# Patient Record
Sex: MALE | Race: BLACK OR AFRICAN AMERICAN | NOT HISPANIC OR LATINO | Employment: UNEMPLOYED | URBAN - METROPOLITAN AREA
[De-identification: names, ages, dates, MRNs, and addresses within clinical notes are randomized per-mention and may not be internally consistent; named-entity substitution may affect disease eponyms.]

---

## 2024-01-17 ENCOUNTER — TRANSFERRED RECORDS (OUTPATIENT)
Dept: MULTI SPECIALTY CLINIC | Facility: CLINIC | Age: 54
End: 2024-01-17

## 2024-01-17 LAB — RETINOPATHY: NORMAL

## 2024-01-31 ENCOUNTER — TRANSFERRED RECORDS (OUTPATIENT)
Dept: HEALTH INFORMATION MANAGEMENT | Facility: CLINIC | Age: 54
End: 2024-01-31

## 2024-04-04 ENCOUNTER — ORDERS ONLY (AUTO-RELEASED) (OUTPATIENT)
Dept: FAMILY MEDICINE | Facility: CLINIC | Age: 54
End: 2024-04-04

## 2024-04-04 ENCOUNTER — OFFICE VISIT (OUTPATIENT)
Dept: FAMILY MEDICINE | Facility: CLINIC | Age: 54
End: 2024-04-04
Payer: COMMERCIAL

## 2024-04-04 VITALS
RESPIRATION RATE: 20 BRPM | HEART RATE: 84 BPM | TEMPERATURE: 97.2 F | HEIGHT: 73 IN | OXYGEN SATURATION: 97 % | BODY MASS INDEX: 39.49 KG/M2 | DIASTOLIC BLOOD PRESSURE: 76 MMHG | WEIGHT: 298 LBS | SYSTOLIC BLOOD PRESSURE: 118 MMHG

## 2024-04-04 DIAGNOSIS — Z12.11 SCREEN FOR COLON CANCER: ICD-10-CM

## 2024-04-04 DIAGNOSIS — E66.01 CLASS 2 SEVERE OBESITY DUE TO EXCESS CALORIES WITH SERIOUS COMORBIDITY AND BODY MASS INDEX (BMI) OF 38.0 TO 38.9 IN ADULT (H): ICD-10-CM

## 2024-04-04 DIAGNOSIS — E78.2 MIXED HYPERLIPIDEMIA: ICD-10-CM

## 2024-04-04 DIAGNOSIS — E66.812 CLASS 2 SEVERE OBESITY DUE TO EXCESS CALORIES WITH SERIOUS COMORBIDITY AND BODY MASS INDEX (BMI) OF 38.0 TO 38.9 IN ADULT (H): ICD-10-CM

## 2024-04-04 DIAGNOSIS — Z11.59 NEED FOR HEPATITIS C SCREENING TEST: ICD-10-CM

## 2024-04-04 DIAGNOSIS — Z00.00 ROUTINE GENERAL MEDICAL EXAMINATION AT A HEALTH CARE FACILITY: Primary | ICD-10-CM

## 2024-04-04 DIAGNOSIS — L84 CALLUS OF FOOT: ICD-10-CM

## 2024-04-04 DIAGNOSIS — E11.3299 TYPE 2 DIABETES MELLITUS WITH MILD NONPROLIFERATIVE RETINOPATHY, WITHOUT LONG-TERM CURRENT USE OF INSULIN, MACULAR EDEMA PRESENCE UNSPECIFIED, UNSPECIFIED LATERALITY (H): ICD-10-CM

## 2024-04-04 DIAGNOSIS — Z11.4 SCREENING FOR HIV (HUMAN IMMUNODEFICIENCY VIRUS): ICD-10-CM

## 2024-04-04 PROBLEM — E11.39 TYPE 2 DIABETES MELLITUS WITH OPHTHALMIC COMPLICATION, WITHOUT LONG-TERM CURRENT USE OF INSULIN (H): Status: ACTIVE | Noted: 2024-04-04

## 2024-04-04 LAB
ALBUMIN SERPL BCG-MCNC: 4.8 G/DL (ref 3.5–5.2)
ALP SERPL-CCNC: 97 U/L (ref 40–150)
ALT SERPL W P-5'-P-CCNC: 24 U/L (ref 0–70)
ANION GAP SERPL CALCULATED.3IONS-SCNC: 11 MMOL/L (ref 7–15)
AST SERPL W P-5'-P-CCNC: 21 U/L (ref 0–45)
BASOPHILS # BLD AUTO: 0 10E3/UL (ref 0–0.2)
BASOPHILS NFR BLD AUTO: 1 %
BILIRUB SERPL-MCNC: 0.3 MG/DL
BUN SERPL-MCNC: 10.5 MG/DL (ref 6–20)
CALCIUM SERPL-MCNC: 10 MG/DL (ref 8.6–10)
CHLORIDE SERPL-SCNC: 101 MMOL/L (ref 98–107)
CHOLEST SERPL-MCNC: 290 MG/DL
CREAT SERPL-MCNC: 1.06 MG/DL (ref 0.67–1.17)
CREAT UR-MCNC: 266 MG/DL
DEPRECATED HCO3 PLAS-SCNC: 26 MMOL/L (ref 22–29)
EGFRCR SERPLBLD CKD-EPI 2021: 85 ML/MIN/1.73M2
EOSINOPHIL # BLD AUTO: 0.1 10E3/UL (ref 0–0.7)
EOSINOPHIL NFR BLD AUTO: 2 %
ERYTHROCYTE [DISTWIDTH] IN BLOOD BY AUTOMATED COUNT: 12 % (ref 10–15)
FASTING STATUS PATIENT QL REPORTED: YES
FASTING STATUS PATIENT QL REPORTED: YES
GLUCOSE SERPL-MCNC: 142 MG/DL (ref 70–99)
GLUCOSE SERPL-MCNC: 142 MG/DL (ref 70–99)
HBA1C MFR BLD: 7.2 % (ref 0–5.6)
HCT VFR BLD AUTO: 48.5 % (ref 40–53)
HCV AB SERPL QL IA: NONREACTIVE
HDLC SERPL-MCNC: 34 MG/DL
HGB BLD-MCNC: 15.8 G/DL (ref 13.3–17.7)
HIV 1+2 AB+HIV1 P24 AG SERPL QL IA: NONREACTIVE
IMM GRANULOCYTES # BLD: 0 10E3/UL
IMM GRANULOCYTES NFR BLD: 1 %
LDLC SERPL CALC-MCNC: 210 MG/DL
LYMPHOCYTES # BLD AUTO: 2.5 10E3/UL (ref 0.8–5.3)
LYMPHOCYTES NFR BLD AUTO: 37 %
MCH RBC QN AUTO: 29 PG (ref 26.5–33)
MCHC RBC AUTO-ENTMCNC: 32.6 G/DL (ref 31.5–36.5)
MCV RBC AUTO: 89 FL (ref 78–100)
MICROALBUMIN UR-MCNC: 25.4 MG/L
MICROALBUMIN/CREAT UR: 9.55 MG/G CR (ref 0–17)
MONOCYTES # BLD AUTO: 0.4 10E3/UL (ref 0–1.3)
MONOCYTES NFR BLD AUTO: 6 %
NEUTROPHILS # BLD AUTO: 3.6 10E3/UL (ref 1.6–8.3)
NEUTROPHILS NFR BLD AUTO: 55 %
NONHDLC SERPL-MCNC: 256 MG/DL
PLATELET # BLD AUTO: 262 10E3/UL (ref 150–450)
POTASSIUM SERPL-SCNC: 4.5 MMOL/L (ref 3.4–5.3)
PROT SERPL-MCNC: 7.7 G/DL (ref 6.4–8.3)
RBC # BLD AUTO: 5.44 10E6/UL (ref 4.4–5.9)
SODIUM SERPL-SCNC: 138 MMOL/L (ref 135–145)
TRIGL SERPL-MCNC: 231 MG/DL
TSH SERPL DL<=0.005 MIU/L-ACNC: 1.91 UIU/ML (ref 0.3–4.2)
WBC # BLD AUTO: 6.7 10E3/UL (ref 4–11)

## 2024-04-04 PROCEDURE — 90471 IMMUNIZATION ADMIN: CPT | Performed by: FAMILY MEDICINE

## 2024-04-04 PROCEDURE — 85025 COMPLETE CBC W/AUTO DIFF WBC: CPT | Performed by: FAMILY MEDICINE

## 2024-04-04 PROCEDURE — 82570 ASSAY OF URINE CREATININE: CPT | Performed by: FAMILY MEDICINE

## 2024-04-04 PROCEDURE — 99386 PREV VISIT NEW AGE 40-64: CPT | Mod: 25 | Performed by: FAMILY MEDICINE

## 2024-04-04 PROCEDURE — 83036 HEMOGLOBIN GLYCOSYLATED A1C: CPT | Performed by: FAMILY MEDICINE

## 2024-04-04 PROCEDURE — 80053 COMPREHEN METABOLIC PANEL: CPT | Performed by: FAMILY MEDICINE

## 2024-04-04 PROCEDURE — 84443 ASSAY THYROID STIM HORMONE: CPT | Performed by: FAMILY MEDICINE

## 2024-04-04 PROCEDURE — 80061 LIPID PANEL: CPT | Performed by: FAMILY MEDICINE

## 2024-04-04 PROCEDURE — 86803 HEPATITIS C AB TEST: CPT | Performed by: FAMILY MEDICINE

## 2024-04-04 PROCEDURE — 36415 COLL VENOUS BLD VENIPUNCTURE: CPT | Performed by: FAMILY MEDICINE

## 2024-04-04 PROCEDURE — 90715 TDAP VACCINE 7 YRS/> IM: CPT | Performed by: FAMILY MEDICINE

## 2024-04-04 PROCEDURE — 82043 UR ALBUMIN QUANTITATIVE: CPT | Performed by: FAMILY MEDICINE

## 2024-04-04 PROCEDURE — 87389 HIV-1 AG W/HIV-1&-2 AB AG IA: CPT | Performed by: FAMILY MEDICINE

## 2024-04-04 PROCEDURE — 99214 OFFICE O/P EST MOD 30 MIN: CPT | Mod: 25 | Performed by: FAMILY MEDICINE

## 2024-04-04 SDOH — HEALTH STABILITY: PHYSICAL HEALTH: ON AVERAGE, HOW MANY DAYS PER WEEK DO YOU ENGAGE IN MODERATE TO STRENUOUS EXERCISE (LIKE A BRISK WALK)?: 0 DAYS

## 2024-04-04 ASSESSMENT — PATIENT HEALTH QUESTIONNAIRE - PHQ9
SUM OF ALL RESPONSES TO PHQ QUESTIONS 1-9: 11
10. IF YOU CHECKED OFF ANY PROBLEMS, HOW DIFFICULT HAVE THESE PROBLEMS MADE IT FOR YOU TO DO YOUR WORK, TAKE CARE OF THINGS AT HOME, OR GET ALONG WITH OTHER PEOPLE: VERY DIFFICULT
SUM OF ALL RESPONSES TO PHQ QUESTIONS 1-9: 11

## 2024-04-04 ASSESSMENT — SOCIAL DETERMINANTS OF HEALTH (SDOH): HOW OFTEN DO YOU GET TOGETHER WITH FRIENDS OR RELATIVES?: THREE TIMES A WEEK

## 2024-04-04 NOTE — PATIENT INSTRUCTIONS
Preventive Care Advice   This is general advice given by our system to help you stay healthy. However, your care team may have specific advice just for you. Please talk to your care team about your preventive care needs.  Nutrition  Eat 5 or more servings of fruits and vegetables each day.  Try wheat bread, brown rice and whole grain pasta (instead of white bread, rice, and pasta).  Get enough calcium and vitamin D. Check the label on foods and aim for 100% of the RDA (recommended daily allowance).  Lifestyle  Exercise at least 150 minutes each week   (30 minutes a day, 5 days a week).  Do muscle strengthening activities 2 days a week. These help control your weight and prevent disease.  No smoking.  Wear sunscreen to prevent skin cancer.  Have a dental exam and cleaning every 6 months.  Yearly exams  See your health care team every year to talk about:  Any changes in your health.  Any medicines your care team has prescribed.  Preventive care, family planning, and ways to prevent chronic diseases.  Shots (vaccines)   HPV shots (up to age 26), if you've never had them before.  Hepatitis B shots (up to age 59), if you've never had them before.  COVID-19 shot: Get this shot when it's due.  Flu shot: Get a flu shot every year.  Tetanus shot: Get a tetanus shot every 10 years.  Pneumococcal, hepatitis A, and RSV shots: Ask your care team if you need these based on your risk.  Shingles shot (for age 50 and up).  General health tests  Diabetes screening:  Starting at age 35, Get screened for diabetes at least every 3 years.  If you are younger than age 35, ask your care team if you should be screened for diabetes.  Cholesterol test: At age 39, start having a cholesterol test every 5 years, or more often if advised.  Bone density scan (DEXA): At age 50, ask your care team if you should have this scan for osteoporosis (brittle bones).  Hepatitis C: Get tested at least once in your life.  STIs (sexually transmitted  infections)  Before age 24: Ask your care team if you should be screened for STIs.  After age 24: Get screened for STIs if you're at risk. You are at risk for STIs (including HIV) if:  You are sexually active with more than one person.  You don't use condoms every time.  You or a partner was diagnosed with a sexually transmitted infection.  If you are at risk for HIV, ask about PrEP medicine to prevent HIV.  Get tested for HIV at least once in your life, whether you are at risk for HIV or not.  Cancer screening tests  Cervical cancer screening: If you have a cervix, begin getting regular cervical cancer screening tests at age 21. Most people who have regular screenings with normal results can stop after age 65. Talk about this with your provider.  Breast cancer scan (mammogram): If you've ever had breasts, begin having regular mammograms starting at age 40. This is a scan to check for breast cancer.  Colon cancer screening: It is important to start screening for colon cancer at age 45.  Have a colonoscopy test every 10 years (or more often if you're at risk) Or, ask your provider about stool tests like a FIT test every year or Cologuard test every 3 years.  To learn more about your testing options, visit: [email or web address].  For help making a decision, visit: https://bit.ly/xe95977.  Prostate cancer screening test: If you have a prostate and are age 55 to 69, ask your provider if you would benefit from a yearly prostate cancer screening test.  Lung cancer screening: If you are a current or former smoker age 50 to 80, ask your care team if ongoing lung cancer screenings are right for you.  For informational purposes only. Not to replace the advice of your health care provider. Copyright   2023 [employer]. All rights reserved. Clinically reviewed by the [facility]. [employer] 121915 - REV 01/24.    Learning About Stress  What is stress?     Stress is your  body's response to a hard situation. Your body can have a physical, emotional, or mental response. Stress is a fact of life for most people, and it affects everyone differently. What causes stress for you may not be stressful for someone else.  A lot of things can cause stress. You may feel stress when you go on a job interview, take a test, or run a race. This kind of short-term stress is normal and even useful. It can help you if you need to work hard or react quickly. For example, stress can help you finish an important job on time.  Long-term stress is caused by ongoing stressful situations or events. Examples of long-term stress include long-term health problems, ongoing problems at work, or conflicts in your family. Long-term stress can harm your health.  How does stress affect your health?  When you are stressed, your body responds as though you are in danger. It makes hormones that speed up your heart, make you breathe faster, and give you a burst of energy. This is called the fight-or-flight stress response. If the stress is over quickly, your body goes back to normal and no harm is done.  But if stress happens too often or lasts too long, it can have bad effects. Long-term stress can make you more likely to get sick, and it can make symptoms of some diseases worse. If you tense up when you are stressed, you may develop neck, shoulder, or low back pain. Stress is linked to high blood pressure and heart disease.  Stress also harms your emotional health. It can make you davila, tense, or depressed. Your relationships may suffer, and you may not do well at work or school.  What can you do to manage stress?  You can try these things to help manage stress:   Do something active. Exercise or activity can help reduce stress. Walking is a great way to get started. Even everyday activities such as housecleaning or yard work can help.  Try yoga or ronnie chi. These techniques combine exercise and meditation. You may need  some training at first to learn them.  Do something you enjoy. For example, listen to music or go to a movie. Practice your hobby or do volunteer work.  Meditate. This can help you relax, because you are not worrying about what happened before or what may happen in the future.  Do guided imagery. Imagine yourself in any setting that helps you feel calm. You can use online videos, books, or a teacher to guide you.  Do breathing exercises. For example:  From a standing position, bend forward from the waist with your knees slightly bent. Let your arms dangle close to the floor.  Breathe in slowly and deeply as you return to a standing position. Roll up slowly and lift your head last.  Hold your breath for just a few seconds in the standing position.  Breathe out slowly and bend forward from the waist.  Let your feelings out. Talk, laugh, cry, and express anger when you need to. Talking with supportive friends or family, a counselor, or a prudence leader about your feelings is a healthy way to relieve stress. Avoid discussing your feelings with people who make you feel worse.  Write. It may help to write about things that are bothering you. This helps you find out how much stress you feel and what is causing it. When you know this, you can find better ways to cope.  What can you do to prevent stress?  You might try some of these things to help prevent stress:  Manage your time. This helps you find time to do the things you want and need to do.  Get enough sleep. Your body recovers from the stresses of the day while you are sleeping.  Get support. Your family, friends, and community can make a difference in how you experience stress.  Limit your news feed. Avoid or limit time on social media or news that may make you feel stressed.  Do something active. Exercise or activity can help reduce stress. Walking is a great way to get started.  Where can you learn more?  Go to https://www.healthwise.net/patiented  Enter N032 in the  "search box to learn more about \"Learning About Stress.\"  Current as of: October 24, 2023               Content Version: 14.0    0239-2561 [employer].   Care instructions adapted under license by your healthcare professional. If you have questions about a medical condition or this instruction, always ask your healthcare professional. [employer] disclaims any warranty or liability for your use of this information.      Learning About Depression Screening  What is depression screening?  Depression screening is a way to see if you have depression symptoms. It may be done by a doctor or counselor. It's often part of a routine checkup. That's because your mental health is just as important as your physical health.  Depression is a mental health condition that affects how you feel, think, and act. You may:  Have less energy.  Lose interest in your daily activities.  Feel sad and grouchy for a long time.  Depression is very common. It affects people of all ages.  Many things can lead to depression. Some people become depressed after they have a stroke or find out they have a major illness like cancer or heart disease. The death of a loved one or a breakup may lead to depression. It can run in families. Most experts believe that a combination of inherited genes and stressful life events can cause it.  What happens during screening?  You may be asked to fill out a form about your depression symptoms. You and the doctor will discuss your answers. The doctor may ask you more questions to learn more about how you think, act, and feel.  What happens after screening?  If you have symptoms of depression, your doctor will talk to you about your options.  Doctors usually treat depression with medicines or counseling. Often, combining the two works best. Many people don't get help because they think that they'll get over the depression on their own. But people with depression may not get better unless they " "get treatment.  The cause of depression is not well understood. There may be many factors involved. But if you have depression, it's not your fault.  A serious symptom of depression is thinking about death or suicide. If you or someone you care about talks about this or about feeling hopeless, get help right away.  It's important to know that depression can be treated. Medicine, counseling, and self-care may help.  Where can you learn more?  Go to https://www.DocumentCloud.net/patiented  Enter T185 in the search box to learn more about \"Learning About Depression Screening.\"  Current as of: June 24, 2023               Content Version: 14.0    0206-5472 [employer].   Care instructions adapted under license by your healthcare professional. If you have questions about a medical condition or this instruction, always ask your healthcare professional. [employer] disclaims any warranty or liability for your use of this information.      Substance Use Disorder: Care Instructions  Overview     You can improve your life and health by stopping your use of alcohol or drugs. When you don't drink or use drugs, you may feel and sleep better. You may get along better with your family, friends, and coworkers. There are medicines and programs that can help with substance use disorder.  How can you care for yourself at home?  Here are some ways to help you stay sober and prevent relapse.  If you have been given medicine to help keep you sober or reduce your cravings, be sure to take it exactly as prescribed.  Talk to your doctor about programs that can help you stop using drugs or drinking alcohol.  Do not keep alcohol or drugs in your home.  Plan ahead. Think about what you'll say if other people ask you to drink or use drugs. Try not to spend time with people who drink or use drugs.  Use the time and money spent on drinking or drugs to do something that's important to you.  Preventing a relapse  Have a plan " to deal with relapse. Learn to recognize changes in your thinking that lead you to drink or use drugs. Get help before you start to drink or use drugs again.  Try to stay away from situations, friends, or places that may lead you to drink or use drugs.  If you feel the need to drink alcohol or use drugs again, seek help right away. Call a trusted friend or family member. Some people get support from organizations such as Narcotics Anonymous or [employer] or from treatment facilities.  If you relapse, get help as soon as you can. Some people make a plan with another person that outlines what they want that person to do for them if they relapse. The plan usually includes how to handle the relapse and who to notify in case of relapse.  Don't give up. Remember that a relapse doesn't mean that you have failed. Use the experience to learn the triggers that lead you to drink or use drugs. Then quit again. Recovery is a lifelong process. Many people have several relapses before they are able to quit for good.  Follow-up care is a key part of your treatment and safety. Be sure to make and go to all appointments, and call your doctor if you are having problems. It's also a good idea to know your test results and keep a list of the medicines you take.  When should you call for help?   Call 911  anytime you think you may need emergency care. For example, call if you or someone else:    Has overdosed or has withdrawal signs. Be sure to tell the emergency workers that you are or someone else is using or trying to quit using drugs. Overdose or withdrawal signs may include:  Losing consciousness.  Seizure.  Seeing or hearing things that aren't there (hallucinations).     Is thinking or talking about suicide or harming others.   Where to get help 24 hours a day, 7 days a week   If you or someone you know talks about suicide, self-harm, a mental health crisis, a substance use crisis, or any other kind of emotional distress, get  "help right away. You can:    Call the Suicide and Crisis Lifeline at 988.     Call 9-794-664-IUOP (1-967.144.6897).     Text HOME to 826802 to access the Crisis Text Line.   Consider saving these numbers in your phone.  Go to [email or web address] for more information or to chat online.  Call your doctor now or seek immediate medical care if:    You are having withdrawal symptoms. These may include nausea or vomiting, sweating, shakiness, and anxiety.   Watch closely for changes in your health, and be sure to contact your doctor if:    You have a relapse.     You need more help or support to stop.   Where can you learn more?  Go to https://www.Book&Table.net/patiented  Enter H573 in the search box to learn more about \"Substance Use Disorder: Care Instructions.\"  Current as of: November 15, 2023               Content Version: 14.0    4891-2271 [employer].   Care instructions adapted under license by your healthcare professional. If you have questions about a medical condition or this instruction, always ask your healthcare professional. [employer] disclaims any warranty or liability for your use of this information.      "

## 2024-04-04 NOTE — PROGRESS NOTES
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  - Glucose; Future  - Lipid panel reflex to direct LDL Non-fasting; Future  - Comprehensive metabolic panel; Future  - Hemoglobin A1c; Future  - Lipid panel reflex to direct LDL Fasting; Future  - TSH with free T4 reflex; Future  - CBC with Platelets & Differential; Future  - Orthopedic  Referral; Future  - Albumin Random Urine Quantitative with Creat Ratio; Future  - Glucose  - Lipid panel reflex to direct LDL Non-fasting  - Comprehensive metabolic panel  - Hemoglobin A1c  - TSH with free T4 reflex  - CBC with Platelets & Differential  - Albumin Random Urine Quantitative with Creat Ratio    Class 2 severe obesity due to excess calories with serious comorbidity and body mass index (BMI) of 38.0 to 38.9 in adult (H)  Discussed weight loss program, consider GLP-1 agonist.    Callus of foot  Refer to podiatrist for additional care.  - Orthopedic  Referral; Future  Depression with possible PTSD, patient encouraged to continue to follow-up with outpatient therapist    Patient has been advised of split billing requirements and indicates understanding:  "Yes  Review of external notes as documented elsewhere in note  40 minutes spent by me on the date of the encounter doing chart review, review of outside records, review of test results, interpretation of tests, patient visit, and documentation   20-minutes in excess was spent in discussing management of diabetes, mental illness, regular follow-up.    Nicotine/Tobacco Cessation  He reports that he has been smoking other. He has never used smokeless tobacco.  Nicotine/Tobacco Cessation Plan  Phone counseling: Place order for Quit Partner Referral      BMI  Estimated body mass index is 38.99 kg/m  as calculated from the following:    Height as of this encounter: 1.862 m (6' 1.31\").    Weight as of this encounter: 135.2 kg (298 lb).   Weight management plan: Discussed healthy diet and exercise guidelines    Depression Screening Follow Up        4/4/2024     9:20 AM   PHQ   PHQ-9 Total Score 11   Q9: Thoughts of better off dead/self-harm past 2 weeks Not at all         4/4/2024     9:20 AM   Last PHQ-9   1.  Little interest or pleasure in doing things 2   2.  Feeling down, depressed, or hopeless 2   3.  Trouble falling or staying asleep, or sleeping too much 3   4.  Feeling tired or having little energy 1   5.  Poor appetite or overeating 1   6.  Feeling bad about yourself 1   7.  Trouble concentrating 1   8.  Moving slowly or restless 0   Q9: Thoughts of better off dead/self-harm past 2 weeks 0   PHQ-9 Total Score 11         Follow Up Actions Taken  Crisis resource information provided in After Visit Summary  Referred patient back to current mental health provider.     Counseling  Appropriate preventive services were discussed with this patient, including applicable screening as appropriate for fall prevention, nutrition, physical activity, Tobacco-use cessation, weight loss and cognition.  Checklist reviewing preventive services available has been given to the patient.  Reviewed patient's diet, addressing concerns " and/or questions.   The patient was instructed to see the dentist every 6 months.   The patient's PHQ-9 score is consistent with moderate depression. He was provided with information regarding depression.       Work on weight loss  Regular exercise    Teddy Dorsey is a 53 year old, presenting for the following:  Physical, Eye Problem (Last vision check was 1/2024 at [facility] Breathing Problem, and Update allergy (Penicillin/)        4/4/2024     9:27 AM   Additional Questions   Roomed by Donna HERNANDES   Accompanied by self        Health Care Directive  Patient does not have a Health Care Directive or Living Will: Discussed advance care planning with patient; information given to patient to review.    HPI  [facility] but [facility] not planning to [facility] because of lack of access and resources.  He has depression,?PTSD, he is currently follow-up with a outpatient therapist, he also has diabetes type 2, initially on Ozempic and insulin, but because of medication and backorder issues only stay on metformin, currently taking as needed for high blood sugar.  Is due for diabetes check.  Denies any excessive urination or thirst.  He is retired from the [employer], also works at [employer] for 11 years, was born in [place].        4/4/2024   General Health   How would you rate your overall physical health? (!) POOR   Feel stress (tense, anxious, or unable to sleep) Very much   (!) STRESS CONCERN      4/4/2024   Nutrition   Three or more servings of calcium each day? (!) NO   Diet: Diabetic   How many servings of fruit and vegetables per day? (!) 2-3   How many sweetened beverages each day? 0-1         4/4/2024   Exercise   Days per week of moderate/strenous exercise 0 days   (!) EXERCISE CONCERN      4/4/2024   Social Factors   Frequency of gathering with friends or relatives Three times a week   Worry food won't last until get money to buy more Yes   Food not last or not have  enough money for food? Yes   Do you have housing?  Yes   Are you worried about losing your housing? No   Lack of transportation? Yes   Unable to get utilities (heat,electricity)? No   (!) FOOD SECURITY CONCERN PRESENT (!) TRANSPORTATION CONCERN PRESENT       No data to display                   4/4/2024   Dental   Dentist two times every year? (!) NO         4/4/2024   TB Screening   Were you born outside of the [place]? No       Today's PHQ-9 Score:       4/4/2024     9:20 AM   PHQ-9 SCORE   PHQ-9 Total Score MyChart 11 (Moderate depression)   PHQ-9 Total Score 11         4/4/2024   Substance Use   Alcohol more than 3/day or more than 7/wk No   Do you use any other substances recreationally? (!) CANNABIS PRODUCTS     Social History     Tobacco Use    Smoking status: Every Day     Types: Other    Smokeless tobacco: Never    Tobacco comments:     Smoke marijuana             4/4/2024   One time HIV Screening   Previous HIV test? No         4/4/2024   STI Screening   New sexual partner(s) since last STI/HIV test? No   ASCVD Risk   The ASCVD Risk score (Nikki CLAY, et al., 2019) failed to calculate for the following reasons:    Cannot find a previous HDL lab    Cannot find a previous total cholesterol lab    The BP treatment status is invalid           Reviewed and updated as needed this visit by Provider                    No past medical history on file.  No past surgical history on file.  BP Readings from Last 3 Encounters:   04/04/24 118/76    Wt Readings from Last 3 Encounters:   04/04/24 135.2 kg (298 lb)                  Patient Active Problem List   Diagnosis    Type 2 diabetes mellitus with ophthalmic complication, without long-term current use of insulin (H)    Class 2 severe obesity due to excess calories with serious comorbidity in adult (H)    Callus of foot     No past surgical history on file.    Social History     Tobacco Use    Smoking status: Every Day     Types: Other    Smokeless tobacco: Never     "Tobacco comments:     Smoke marijuana   Substance Use Topics    Alcohol use: Not on file     No family history on file.      Recent Labs   Lab Test 04/04/24  1032   A1C 7.2*   *   HDL 34*   TRIG 231*   ALT 24   CR 1.06   GFRESTIMATED 85   POTASSIUM 4.5   TSH 1.91          Review of Systems  Constitutional, HEENT, cardiovascular, pulmonary, gi and gu systems are negative, except as otherwise noted.     Objective    Exam  /76 (BP Location: Left arm, Patient Position: Sitting, Cuff Size: Adult Large)   Pulse 84   Temp 97.2  F (36.2  C) (Temporal)   Resp 20   Ht 1.862 m (6' 1.31\")   Wt 135.2 kg (298 lb)   SpO2 97%   BMI 38.99 kg/m     Estimated body mass index is 38.99 kg/m  as calculated from the following:    Height as of this encounter: 1.862 m (6' 1.31\").    Weight as of this encounter: 135.2 kg (298 lb).    Physical Exam  GENERAL: alert and no distress  NECK: no adenopathy, no asymmetry, masses, or scars  RESP: lungs clear to auscultation - no rales, rhonchi or wheezes  CV: regular rate and rhythm, normal S1 S2, no S3 or S4, no murmur, click or rub, no peripheral edema  ABDOMEN: soft, nontender, no hepatosplenomegaly, no masses and bowel sounds normal  MS: no gross musculoskeletal defects noted, no edema  NEURO: Normal strength and tone, mentation intact and speech normal  Diabetic foot exam: normal DP and PT pulses, normal sensory exam, trophic changes calluses, bunions, and onychomycosis        Signed Electronically by: Kiana Fowler MD      Prior to immunization administration, verified patients identity using patient s name and date of birth. Please see Immunization Activity for additional information.     Screening Questionnaire for Adult Immunization    Are you sick today?   No   Do you have allergies to medications, food, a vaccine component or latex?   Yes   Have you ever had a serious reaction after receiving a vaccination?   Don't Know   Do you have a long-term health problem with " heart, lung, kidney, or metabolic disease (e.g., diabetes), asthma, a blood disorder, no spleen, complement component deficiency, a cochlear implant, or a spinal fluid leak?  Are you on long-term aspirin therapy?   Yes   Do you have cancer, leukemia, HIV/AIDS, or any other immune system problem?   No   Do you have a parent, brother, or sister with an immune system problem?   No   In the past 3 months, have you taken medications that affect  your immune system, such as prednisone, other steroids, or anticancer drugs; drugs for the treatment of rheumatoid arthritis, Crohn s disease, or psoriasis; or have you had radiation treatments?   No   Have you had a seizure, or a brain or other nervous system problem?   No   During the past year, have you received a transfusion of blood or blood    products, or been given immune (gamma) globulin or antiviral drug?   No   For women: Are you pregnant or is there a chance you could become       pregnant during the next month?   NA   Have you received any vaccinations in the past 4 weeks?   No     Immunization questionnaire was positive for at least one answer.  Notified .      Patient instructed to remain in clinic for 15 minutes afterwards, and to report any adverse reactions.     Screening performed by Donna Foster MA on 4/4/2024 at 9:34 AM.        Answers submitted by the patient for this visit:  Patient Health Questionnaire (Submitted on 4/4/2024)  If you checked off any problems, how difficult have these problems made it for you to do your work, take care of things at home, or get along with other people?: Very difficult  PHQ9 TOTAL SCORE: 11

## 2024-04-04 NOTE — COMMUNITY RESOURCES LIST (ENGLISH)
April 4, 2024           YOUR PERSONALIZED LIST OF SERVICES & PROGRAMS           NAVIGATION    Eligibility Screening      Sure - Navigators  Phone: [phone]  Website: https://www.Midawi Holdingsorg/about-us/assister-program/navigators/index.jsp  Language: English  Hours: Mon 8:00 AM - 4:00 PM Tue 8:00 AM - 4:00 PM Wed 8:00 AM - 4:00 PM Thu 8:00 AM - 4:00 PM      Sure - Certified Application Counselor (CAC)  Phone: [phone]  Website: https://www.Hello Curry.org/about-us/assister-program/cacs/index.jsp  Language: English  Hours: Mon 8:00 AM - 4:00 PM Tue 8:00 AM - 4:00 PM Wed 8:00 AM - 4:00 PM Thu 8:00 AM - 4:00 PM      ERPLY - SNAP (formerly food stamps) Screening and Application help  Phone: [phone] [phone]  Website: https://www.UseTogether.org/programs/[place]-food-helpline/  Language: English  Hours: Mon 10:00 AM - 5:00 PM Tue 10:00 AM - 5:00 PM Wed 10:00 AM - 5:00 PM Thu 10:00 AM - 5:00 PM Fri 10:00 AM - 5:00 PM  Fee: Free  Accessibility: Ada accessible, Blind accommodation, Deaf or hard of hearing, Translation services        ASSISTANCE    Nutrition Benefits      - SNAP Eligibility Assistance  Website: https://www.Bikmo/  Language: English  Fee: Free      [employer] [place] - SNAP (formerly food stamps) Screening and Application help  Phone: [phone]  Website: https://www.UseTogether.org/programs/[place]-food-helpline/  Language: English  Hours: Mon 10:00 AM - 5:00 PM Tue 10:00 AM - 5:00 PM Wed 10:00 AM - 5:00 PM Thu 10:00 AM - 5:00 PM Fri 10:00 AM - 5:00 PM  Fee: Free  Accessibility: Ada accessible, Blind accommodation, Deaf or hard of hearing, Translation services      [employer] [place] [facility]  Phone: [phone]) [phone]  Website: https://www.UseTogether.org/programs/market-bucks/  Language: English  Hours: Mon 10:00 AM - 5:00 PM Tue 10:00 AM - 5:00 PM Wed 10:00 AM - 5:00 PM Thu 10:00 AM - 5:00 PM Fri 10:00 AM - 5:00 PM  Fee: Self  pay    [facility] - Food Shelf  [address] [place] [address] (Distance: 3.2 miles)  Phone: [phone]) [phone]  Website: http://www.Tap 'n Tap  Language: English, Chinese, Bengali, Bahamian, Hmong  Fee: Free  Accessibility: Translation services      [facility]  [address] [place] [address] (Distance: 1.1 miles)  Phone: [phone]  Website: https://[facility].org/find-support/partner-organizations/housing-assistance/  Language: English  Fee: Free  Accessibility: Ada accessible      [employer]  Phone: [phone]) 525-4777  Website: https://www.Berry Kitchen/empowerment-food-bank  Language: English  Hours: Mon 9:00 AM - 5:00 PM Tue 9:00 AM - 5:00 PM Wed 9:00 AM - 5:00 PM Thu 9:00 AM - 5:00 PM Fri 9:00 AM - 5:00 PM  Fee: Free        & RECREATION    [employer] - Sports clubs and recreational activities  [address] [place] [address] (Distance: 0.7 miles)  Phone: [phone]) [phone]  Website: https://niid.to.[employer]/  Language: English  Fee: Self pay      [facility] [facility] - Sports clubs and recreational activities - [facility] [employer] [facility]  [address] [place] [address] (Distance: 2.5 miles)  Phone: [phone]  Language: English, Hmong, Nepali  Fee: Self pay      [facility] - Adult Enrichment  Phone: [phone]  Website: https://Veratect/adults-seniors/adult-enrichment/  Language: English  Hours: Mon 7:30 AM - 4:00 PM Tue 7:30 AM - 4:00 PM Wed 7:30 AM - 4:00 PM Thu 7:30 AM - 4:00 PM Fri 7:30 AM - 4:00 PM    Classes/Groups      [facility] Group fitness classes - [employer] [address] [place] [address] (Distance: 1.0 miles)  Language: English  Fee: Free, Self pay, Sliding scale      [facility] - Group fitness  classes  [address] [place] [address] (Distance: 0.9 miles)  Phone: [phone]  Website: https://www.plb600.org/communityeducation  Language: English, Congolese, South Sudanese, Hmong  Fee: Free, Self pay  Accessibility: Translation services, Ada accessible      [facility] - Adult Enrichment  Phone: [phone]) [phone]  Website: https://[employer]/adults-seniors/adult-enrichment/  Language: English  Hours: Mon 7:30 AM - 4:00 PM Tue 7:30 AM - 4:00 PM Wed 7:30 AM - 4:00 PM Thu 7:30 AM - 4:00 PM Fri 7:30 AM - 4:00 PM            Medical Transportation, (NEMT)      Health - Transportation Assistance  [address] [place] [address] (Distance: 10.1 miles)  Phone: [phone]) [phone]  Website: https://[facility].org/living-with-hiv/assistance/transportation/  Language: English      [facility] - [employer] - Transportation to medical appointments  [address] [address] (Distance: 10.2 miles)  Phone: [phone]) [phone]  Website: https://www.[employer]/healthy/public/portalcomponents/PublicContentServlet?contentId=Q84VJ_87047009  Language: English, Congolese, Hebrew, South Sudanese  Fee: Insurance  Accessibility: Translation services      [employer] - Neighbor to Neighbor Program  Phone: (446) 174-5060  Email: michel@[facility].org  Website: https://www.[facility].org/services/older-adults/-services/neighbor-to-neighbor  Language: English  Hours: Mon 8:00 AM - 5:00 PM Tue 8:00 AM - 5:00 PM Wed 8:00 AM - 5:00 PM Thu 8:00 AM - 5:00 PM Fri 8:00 AM - 5:00 PM  Fee: Insurance, Self pay  Accessibility: Deaf or hard of hearing, Blind accommodation, Translation services    Expense Assistance      [employer] - Express Services  [address] [place] [address] (Distance: 12.0 miles)  Phone: ([phone]) [phone]  Website: https://www.[employer]utch[employer] Technology/express  Language: English      [employer]EL[employer] - RUNAWAY YOUTH CRISIS  SERVICES - NATIONAL RUNAWAY SAFELINE  Phone: (375) 641-7070  Website: https://www.AltraBiofuels/  Language: English      - Dislocated Worker/Adult [employer] Employment Program  Phone: (475) 559-2289  Email: mary@[employer]  Website: https://[employer]/services/employment-services/dislocated-worker-program/  Language: English, Eritrean  Hours: Mon 8:00 AM - 4:30 PM Tue 8:00 AM - 4:30 PM Wed 8:00 AM - 4:30 PM Thu 8:00 AM - 4:30 PM Fri 8:00 AM - 4:30 PM  Fee: Free  Accessibility: Ada accessible    [facility] - Free or low-cost transportation  [address] [place] [address] (Distance: 3.6 miles)  Phone: (819) 922-4478  Website: https://wwwMinistry of Supply/  Language: English  Fee: Free  Accessibility: Ada accessible      [facility] - Free Bus and Gas Cards - Free or low-cost transportation  [address] [place] [address] (Distance: 2.0 miles)  Phone: [phone]) [phone]  Language: English  Fee: Free      P[employer]S - FREE AIR TRANSPORTATION FOR NON-EMERGENCY MEDICAL OR HUMANITARIAN FLIGHTS  Phone: (352) 959-5605  Email: missions@Sportcut  Website: http://www.Sportcut  Language: English               IMPORTANT NUMBERS & WEBSITES        Emergency Services  911  .   [facility]  211 http://211unitedway.org  .   Poison Control  (229) 957-9428 http://mnpoison.org http://wisconsinpoison.org  .     Suicide and Crisis Lifeline  988 http://988lifeline.org  .   Childhelp National Child Abuse Hotline  485.697.9489 http://Childhelphotline.org   .   National Sexual Assault Hotline  (102) 292-3035 (HOPE) http://Rainn.org   .     National Runaway Safeline  (195) 623-3728 (RUNAWAY) http://1800Gooddler  .   Pregnancy & Postpartum Support  Call/text 202-385-9841  [place]: http://ppsupportmn.org  [place]: http://Mompery.com/wi  .   Substance Abuse National Helpline ([facility])  557-450-ILIB (5057) http://Findtreatment.gov   .                DISCLAIMER: These  resources have been generated via the garbs Platform. garbs does not endorse any service providers mentioned in this resource list. garbs does not guarantee that the services mentioned in this resource list will be available to you or will improve your health or wellness.    [facility]

## 2024-04-05 ENCOUNTER — TELEPHONE (OUTPATIENT)
Dept: FAMILY MEDICINE | Facility: CLINIC | Age: 54
End: 2024-04-05
Payer: COMMERCIAL

## 2024-04-05 PROBLEM — L84 CALLUS OF FOOT: Status: ACTIVE | Noted: 2024-04-05

## 2024-04-05 RX ORDER — ASPIRIN 81 MG/1
81 TABLET, CHEWABLE ORAL DAILY
Qty: 90 TABLET | Refills: 3 | Status: SHIPPED | OUTPATIENT
Start: 2024-04-05

## 2024-04-05 RX ORDER — METFORMIN HCL 500 MG
500 TABLET, EXTENDED RELEASE 24 HR ORAL 2 TIMES DAILY WITH MEALS
Qty: 180 TABLET | Refills: 3 | Status: SHIPPED | OUTPATIENT
Start: 2024-04-05

## 2024-04-05 RX ORDER — ATORVASTATIN CALCIUM 20 MG/1
20 TABLET, FILM COATED ORAL DAILY
Qty: 90 TABLET | Refills: 3 | Status: SHIPPED | OUTPATIENT
Start: 2024-04-05

## 2024-04-05 NOTE — RESULT ENCOUNTER NOTE
Blood sugars running high, I sent the metformin to take 1 tablet twice a day, cholesterol was also elevated, I sent new prescription called atorvastatin to take 1 tablet daily at night, also sent a new prescription for baby aspirin to start taking daily to prevent cardiovascular disease.

## 2024-04-08 ENCOUNTER — TELEPHONE (OUTPATIENT)
Dept: FAMILY MEDICINE | Facility: CLINIC | Age: 54
End: 2024-04-08
Payer: COMMERCIAL

## 2024-04-08 DIAGNOSIS — E11.3299 TYPE 2 DIABETES MELLITUS WITH MILD NONPROLIFERATIVE RETINOPATHY, WITHOUT LONG-TERM CURRENT USE OF INSULIN, MACULAR EDEMA PRESENCE UNSPECIFIED, UNSPECIFIED LATERALITY (H): Primary | ICD-10-CM

## 2024-04-08 NOTE — TELEPHONE ENCOUNTER
Dr Fowler,    Before we relay this to pt can you advise when you would want to see her again or do repeat labs? Or just wait for his 8/5 follow up appt with you in clinic?    Alyssa Loera, RN, BSN  [facility]

## 2024-04-08 NOTE — TELEPHONE ENCOUNTER
----- Message from Annabelle Hong MA sent at 4/5/2024  9:49 AM CDT -----    ----- Message -----  From: Kiana Fowler MD  Sent: 4/5/2024   9:35 AM CDT  To: [facility] Care Team Pool    Blood sugars running high, I sent the metformin to take 1 tablet twice a day, cholesterol was also elevated, I sent new prescription called atorvastatin to take 1 tablet daily at night, also sent a new prescription for baby aspirin to start taking daily to prevent cardiovascular disease.

## 2024-04-08 NOTE — TELEPHONE ENCOUNTER
Writer called patient:  Left message to call back and ask to speak with an available triage nurse.  LOUISE HamiltonN, RN-BC  [facility]

## 2024-04-09 NOTE — TELEPHONE ENCOUNTER
"Called & discussed results and new medications with patient. He verbalized understanding.     He asked if Ozempic was sent to pharmacy as well and reports this was discussed during last office visit with Dr. Fowler. He was on this previously with his prior PCP and reports it was very helpful. Would like to start this again.     Per 4/4 office visit notes:    \"Class 2 severe obesity due to excess calories with serious comorbidity and body mass index (BMI) of 38.0 to 38.9 in adult (H)  Discussed weight loss program, consider GLP-1 agonist.\"  "

## 2024-04-10 NOTE — TELEPHONE ENCOUNTER
Retail Pharmacy Prior Authorization Team   Phone: [phone]     Pharmacy did not have updated insurance info for the patient. Obtained and reprocessed with no issues.

## 2024-04-10 NOTE — TELEPHONE ENCOUNTER
Retail Pharmacy Prior Authorization Team   Phone: [phone]    Pharmacy did not have updated insurance info for the patient. Obtained and reprocessed with no issues.

## 2024-04-10 NOTE — TELEPHONE ENCOUNTER
"RN made 1st call to pt to relay provider message re: Ozempic.     No answer. LM on   with instruction to call back and ask for triage nurse and let staff know this is a return call.    If pt  calls back, please relay provider message below.    Will try again.    Belen HANSEN RN  [facility]     Kiana Fowler MD [place] Care Team Pool18 hours ago (4:57 PM)     Ozempic prescription sent to start with 0.25 mg weekly for 5 to 6-week.  Then will transition to 0.5 mg weekly.       Kenia Lincoln, RN Registered Nurse SignedYesterday       Called & discussed results and new medications with patient. He verbalized understanding.      He asked if Ozempic was sent to pharmacy as well and reports this was discussed during last office visit with Dr. Fowler. He was on this previously with his prior PCP and reports it was very helpful. Would like to start this again.      Per 4/4 office visit notes:     \"Class 2 severe obesity due to excess calories with serious comorbidity and body mass index (BMI) of 38.0 to 38.9 in adult (H)  Discussed weight loss program, consider GLP-1 agonist.\"        "

## 2024-04-10 NOTE — TELEPHONE ENCOUNTER
Retail Pharmacy Prior Authorization Team   Phone: [phone]     Pharmacy did not have updated insurance info for the patient. Obtained and reprocessed with no issues.

## 2024-04-11 ENCOUNTER — OFFICE VISIT (OUTPATIENT)
Dept: PODIATRY | Facility: CLINIC | Age: 54
End: 2024-04-11
Payer: COMMERCIAL

## 2024-04-11 VITALS — WEIGHT: 298 LBS | HEART RATE: 85 BPM | HEIGHT: 73 IN | BODY MASS INDEX: 39.49 KG/M2 | OXYGEN SATURATION: 98 %

## 2024-04-11 DIAGNOSIS — B35.1 ONYCHOMYCOSIS: Primary | ICD-10-CM

## 2024-04-11 DIAGNOSIS — L60.2 ONYCHAUXIS: ICD-10-CM

## 2024-04-11 DIAGNOSIS — E11.3299 TYPE 2 DIABETES MELLITUS WITH MILD NONPROLIFERATIVE RETINOPATHY, WITHOUT LONG-TERM CURRENT USE OF INSULIN, MACULAR EDEMA PRESENCE UNSPECIFIED, UNSPECIFIED LATERALITY (H): ICD-10-CM

## 2024-04-11 DIAGNOSIS — L85.3 DRY SKIN: ICD-10-CM

## 2024-04-11 PROCEDURE — 99243 OFF/OP CNSLTJ NEW/EST LOW 30: CPT | Mod: 25 | Performed by: PODIATRIST

## 2024-04-11 PROCEDURE — 11721 DEBRIDE NAIL 6 OR MORE: CPT | Performed by: PODIATRIST

## 2024-04-11 RX ORDER — AMMONIUM LACTATE 12 G/100G
CREAM TOPICAL 2 TIMES DAILY
Qty: 385 G | Refills: 3 | Status: SHIPPED | OUTPATIENT
Start: 2024-04-11

## 2024-04-11 ASSESSMENT — PAIN SCALES - GENERAL: PAINLEVEL: NO PAIN (0)

## 2024-04-11 NOTE — Clinical Note
4/11/2024         RE: Willian Vickers  [address]  [place] [address]        Dear Colleague,    Thank you for referring your patient, Willian Vickers, to the [facility]. Please see a copy of my visit note below.    FOOT AND ANKLE SURGERY/PODIATRY CONSULT NOTE         ASSESSMENT:   ***      TREATMENT:  ***        HPI: I was asked to see Willian Vickers today  ***.  The patient was seen in consultation at the request of Kiana Fowler MD for ***.     Past Medical History:   Diagnosis Date     Diabetes (H)        Social History     Socioeconomic History     Marital status: Single     Spouse name: Not on file     Number of children: Not on file     Years of education: Not on file     Highest education level: Not on file   Occupational History     Not on file   Tobacco Use     Smoking status: Every Day     Types: Other     Smokeless tobacco: Never     Tobacco comments:     Smoke marijuana   Substance and Sexual Activity     Alcohol use: Not on file     Drug use: Not on file     Sexual activity: Not on file   Other Topics Concern     Not on file   Social History Narrative     Not on file     Social Determinants of Health     Financial Resource Strain: Low Risk  (4/4/2024)    Financial Resource Strain      Within the past 12 months, have you or your family members you live with been unable to get utilities (heat, electricity) when it was really needed?: No   Food Insecurity: High Risk (4/4/2024)    Food Insecurity      Within the past 12 months, did you worry that your food would run out before you got money to buy more?: Yes      Within the past 12 months, did the food you bought just not last and you didn t have money to get more?: Yes   Transportation Needs: High Risk (4/4/2024)    Transportation Needs      Within the past 12 months, has lack of transportation kept you from medical appointments, getting your medicines, non-medical meetings or appointments, work, or from  getting things that you need?: Yes   Physical Activity: Unknown (4/4/2024)    Exercise Vital Sign      Days of Exercise per Week: 0 days      Minutes of Exercise per Session: Not on file   Stress: Stress Concern Present (4/4/2024)    [employer] - Occupational Stress Questionnaire      Feeling of Stress : Very much   Social Connections: Unknown (4/4/2024)    Social Connection and Isolation Panel [NHANES]      Frequency of Communication with Friends and Family: Not on file      Frequency of Social Gatherings with Friends and Family: Three times a week      Attends [employer] Services: Not on file      Active Member of Clubs or Organizations: Not on file      Attends Club or Organization Meetings: Not on file      Marital Status: Not on file   Interpersonal Safety: Low Risk  (4/4/2024)    Interpersonal Safety      Do you feel physically and emotionally safe where you currently live?: Yes      Within the past 12 months, have you been hit, slapped, kicked or otherwise physically hurt by someone?: No      Within the past 12 months, have you been humiliated or emotionally abused in other ways by your partner or ex-partner?: No   Housing Stability: Low Risk  (4/4/2024)    Housing Stability      Do you have housing? : Yes      Are you worried about losing your housing?: No        Not on File       Current Outpatient Medications:      aspirin (ASA) 81 MG chewable tablet, Take 1 tablet (81 mg) by mouth daily, Disp: 90 tablet, Rfl: 3     atorvastatin (LIPITOR) 20 MG tablet, Take 1 tablet (20 mg) by mouth daily, Disp: 90 tablet, Rfl: 3     metFORMIN (GLUCOPHAGE XR) 500 MG 24 hr tablet, Take 1 tablet (500 mg) by mouth 2 times daily (with meals), Disp: 180 tablet, Rfl: 3     semaglutide (OZEMPIC) 2 MG/3ML pen, Inject 0.25 mg Subcutaneous every 7 days, Disp: 3 mL, Rfl: 2     History reviewed. No pertinent family history.     Social History     Socioeconomic History     Marital status: Single     Spouse  name: Not on file     Number of children: Not on file     Years of education: Not on file     Highest education level: Not on file   Occupational History     Not on file   Tobacco Use     Smoking status: Every Day     Types: Other     Smokeless tobacco: Never     Tobacco comments:     Smoke marijuana   Substance and Sexual Activity     Alcohol use: Not on file     Drug use: Not on file     Sexual activity: Not on file   Other Topics Concern     Not on file   Social History Narrative     Not on file     Social Determinants of Health     Financial Resource Strain: Low Risk  (4/4/2024)    Financial Resource Strain      Within the past 12 months, have you or your family members you live with been unable to get utilities (heat, electricity) when it was really needed?: No   Food Insecurity: High Risk (4/4/2024)    Food Insecurity      Within the past 12 months, did you worry that your food would run out before you got money to buy more?: Yes      Within the past 12 months, did the food you bought just not last and you didn t have money to get more?: Yes   Transportation Needs: High Risk (4/4/2024)    Transportation Needs      Within the past 12 months, has lack of transportation kept you from medical appointments, getting your medicines, non-medical meetings or appointments, work, or from getting things that you need?: Yes   Physical Activity: Unknown (4/4/2024)    Exercise Vital Sign      Days of Exercise per Week: 0 days      Minutes of Exercise per Session: Not on file   Stress: Stress Concern Present (4/4/2024)    [employer] [place] [employer] - Occupational Stress Questionnaire      Feeling of Stress : Very much   Social Connections: Unknown (4/4/2024)    Social Connection and Isolation Panel [NHANES]      Frequency of Communication with Friends and Family: Not on file      Frequency of Social Gatherings with Friends and Family: Three times a week      Attends [employer] Services: Not on file      Active  "Member of Clubs or Organizations: Not on file      Attends Club or Organization Meetings: Not on file      Marital Status: Not on file   Interpersonal Safety: Low Risk  (4/4/2024)    Interpersonal Safety      Do you feel physically and emotionally safe where you currently live?: Yes      Within the past 12 months, have you been hit, slapped, kicked or otherwise physically hurt by someone?: No      Within the past 12 months, have you been humiliated or emotionally abused in other ways by your partner or ex-partner?: No   Housing Stability: Low Risk  (4/4/2024)    Housing Stability      Do you have housing? : Yes      Are you worried about losing your housing?: No        Review of Systems - Patient denies fever, chills, rash, wound, stiffness, limping, numbness, weakness, heart burn, blood in stool, chest pain with activity, calf pain when walking, shortness of breath with activity, chronic cough, easy bleeding/bruising, swelling of ankles, excessive thirst, fatigue, depression, anxiety.  Patient admits to ***.      OBJECTIVE:  Appearance: alert, well appearing, and in no distress.    Pulse 85   Ht 1.858 m (6' 1.13\")   Wt 135.2 kg (298 lb)   SpO2 98%   BMI 39.18 kg/m       Body mass index is 39.18 kg/m .     General appearance: Patient is alert and fully cooperative with history & exam.  No sign of distress is noted during the visit.  Psychiatric: Affect is pleasant & appropriate.  Patient appears motivated to improve health.  Respiratory: Breathing is regular & unlabored while sitting.  HEENT: Hearing is intact to spoken word.  Speech is clear.  No gross evidence of visual impairment that would impact ambulation.    Vascular: Dorsalis pedis and posterior tibial pulses are palpable. There is pedal hair growth ***.  CFT < 3 sec from anterior tibial surface to distal digits ***. There is no appreciable edema noted.  Dermatologic: Turgor and texture are within normal limits. No coloration or temperature changes. No " primary or secondary lesions noted.  Neurologic: All epicritic and proprioceptive sensations are grossly intact ***.  Musculoskeletal: All active and passive ankle, subtalar, midtarsal, and 1st MPJ range of motion are grossly intact without pain or crepitus, with the exception of ***. Manual muscle strength is ***. All dorsiflexors, plantarflexors, invertors, evertors are intact ***. Tenderness present to *** on palpation. Tenderness to *** with range of motion. Calf is soft/non-tender without warmth/induration    Imaging:       No images are attached to the encounter or orders placed in the encounter.     No results found.   No results found.         Javed Amezcua DPM  [facility] & Ankle Surgery/Podiatry         Again, thank you for allowing me to participate in the care of your patient.        Sincerely,        Javed Etienne DPM

## 2024-04-11 NOTE — TELEPHONE ENCOUNTER
Writer spoke with patient regarding Ozempic. Patient stated understanding.     LOUISE OrtizN RN  [facility]

## 2024-04-11 NOTE — PATIENT INSTRUCTIONS
Podiatry Clinics that offer foot and toenail care  [facility]  [facility]  [phone]    Foot and Ankle Clinics, [facility]  [phone]    [facility]  [place] - Dr. Nguyen on Tuesdays  [phone]    [facility]  [place]  [phone]    [facility]  [facility]  [phone]    [facility]  [phone]    [facility]  [phone]      [facility]  *Nurse comes to your home for nail care.  Avelina Calvin RN Foot Specialist  [phone]

## 2024-04-11 NOTE — PROGRESS NOTES
FOOT AND ANKLE SURGERY/PODIATRY CONSULT NOTE         ASSESSMENT:   Onychomycosis  Onychauxis  Dry skin  DM2      TREATMENT:  Debrided nails 1 through 5 bilateral feet today.  I recommended the patient have his nails treated every 2 months.  He was also given a prescription for Lac-Hydrin to be applied twice daily.        HPI: I was asked to see Willian Vickers today to evaluate and treat long thick painful nails both feet.  The patient indicated that he can no longer treat his nails.  They are extremely thick and discolored.  He has a difficult time wearing shoes due to the thickness of the toenails.  This creates pressure and pain on the top of the toes.  The patient also complains of dry skin on the bottom of both feet.  He has not had any open wounds.  No drainage or bleeding noted.  The patient has no numbness.  He has been experiencing some tingling over the past several days.  He denies any trauma to his feet.  He has not had any redness or swelling.  The patient was seen in consultation at the request of Kiana Fowler MD for evaluation and treatment of thickened toenails both feet.     Past Medical History:   Diagnosis Date    Diabetes (H)        Social History     Socioeconomic History    Marital status: Single     Spouse name: Not on file    Number of children: Not on file    Years of education: Not on file    Highest education level: Not on file   Occupational History    Not on file   Tobacco Use    Smoking status: Every Day     Types: Other    Smokeless tobacco: Never    Tobacco comments:     Smoke marijuana   Substance and Sexual Activity    Alcohol use: Not on file    Drug use: Not on file    Sexual activity: Not on file   Other Topics Concern    Not on file   Social History Narrative    Not on file     Social Determinants of Health     Financial Resource Strain: Low Risk  (4/4/2024)    Financial Resource Strain     Within the past 12 months, have you or your family members you live with been unable to  get utilities (heat, electricity) when it was really needed?: No   Food Insecurity: High Risk (4/4/2024)    Food Insecurity     Within the past 12 months, did you worry that your food would run out before you got money to buy more?: Yes     Within the past 12 months, did the food you bought just not last and you didn t have money to get more?: Yes   Transportation Needs: High Risk (4/4/2024)    Transportation Needs     Within the past 12 months, has lack of transportation kept you from medical appointments, getting your medicines, non-medical meetings or appointments, work, or from getting things that you need?: Yes   Physical Activity: Unknown (4/4/2024)    Exercise Vital Sign     Days of Exercise per Week: 0 days     Minutes of Exercise per Session: Not on file   Stress: Stress Concern Present (4/4/2024)    [employer] [employer] - Occupational Stress Questionnaire     Feeling of Stress : Very much   Social Connections: Unknown (4/4/2024)    Social Connection and Isolation Panel [NHANES]     Frequency of Communication with Friends and Family: Not on file     Frequency of Social Gatherings with Friends and Family: Three times a week     Attends [employer] Services: Not on file     Active Member of Clubs or Organizations: Not on file     Attends Club or Organization Meetings: Not on file     Marital Status: Not on file   Interpersonal Safety: Low Risk  (4/4/2024)    Interpersonal Safety     Do you feel physically and emotionally safe where you currently live?: Yes     Within the past 12 months, have you been hit, slapped, kicked or otherwise physically hurt by someone?: No     Within the past 12 months, have you been humiliated or emotionally abused in other ways by your partner or ex-partner?: No   Housing Stability: Low Risk  (4/4/2024)    Housing Stability     Do you have housing? : Yes     Are you worried about losing your housing?: No        Not on File       Current Outpatient Medications:      aspirin (ASA) 81 MG chewable tablet, Take 1 tablet (81 mg) by mouth daily, Disp: 90 tablet, Rfl: 3    atorvastatin (LIPITOR) 20 MG tablet, Take 1 tablet (20 mg) by mouth daily, Disp: 90 tablet, Rfl: 3    metFORMIN (GLUCOPHAGE XR) 500 MG 24 hr tablet, Take 1 tablet (500 mg) by mouth 2 times daily (with meals), Disp: 180 tablet, Rfl: 3    semaglutide (OZEMPIC) 2 MG/3ML pen, Inject 0.25 mg Subcutaneous every 7 days, Disp: 3 mL, Rfl: 2     History reviewed. No pertinent family history.     Social History     Socioeconomic History    Marital status: Single     Spouse name: Not on file    Number of children: Not on file    Years of education: Not on file    Highest education level: Not on file   Occupational History    Not on file   Tobacco Use    Smoking status: Every Day     Types: Other    Smokeless tobacco: Never    Tobacco comments:     Smoke marijuana   Substance and Sexual Activity    Alcohol use: Not on file    Drug use: Not on file    Sexual activity: Not on file   Other Topics Concern    Not on file   Social History Narrative    Not on file     Social Determinants of Health     Financial Resource Strain: Low Risk  (4/4/2024)    Financial Resource Strain     Within the past 12 months, have you or your family members you live with been unable to get utilities (heat, electricity) when it was really needed?: No   Food Insecurity: High Risk (4/4/2024)    Food Insecurity     Within the past 12 months, did you worry that your food would run out before you got money to buy more?: Yes     Within the past 12 months, did the food you bought just not last and you didn t have money to get more?: Yes   Transportation Needs: High Risk (4/4/2024)    Transportation Needs     Within the past 12 months, has lack of transportation kept you from medical appointments, getting your medicines, non-medical meetings or appointments, work, or from getting things that you need?: Yes   Physical Activity: Unknown (4/4/2024)    Exercise Vital  "Sign     Days of Exercise per Week: 0 days     Minutes of Exercise per Session: Not on file   Stress: Stress Concern Present (4/4/2024)    [employer]ational [employer] - Occupational Stress Questionnaire     Feeling of Stress : Very much   Social Connections: Unknown (4/4/2024)    Social Connection and Isolation Panel [NHANES]     Frequency of Communication with Friends and Family: Not on file     Frequency of Social Gatherings with Friends and Family: Three times a week     Attends [employer] Services: Not on file     Active Member of Clubs or Organizations: Not on file     Attends Club or Organization Meetings: Not on file     Marital Status: Not on file   Interpersonal Safety: Low Risk  (4/4/2024)    Interpersonal Safety     Do you feel physically and emotionally safe where you currently live?: Yes     Within the past 12 months, have you been hit, slapped, kicked or otherwise physically hurt by someone?: No     Within the past 12 months, have you been humiliated or emotionally abused in other ways by your partner or ex-partner?: No   Housing Stability: Low Risk  (4/4/2024)    Housing Stability     Do you have housing? : Yes     Are you worried about losing your housing?: No        Review of Systems - Patient denies fever, chills, rash, wound, stiffness, numbness, weakness, heart burn, blood in stool, chest pain with activity, calf pain when walking, shortness of breath with activity, chronic cough, easy bleeding/bruising, swelling of ankles, excessive thirst, fatigue, depression, anxiety.  Patient admits to painful thick toenails both feet and dry skin on the bottom of both feet.      OBJECTIVE:  Appearance: alert, well appearing, and in no distress.    Pulse 85   Ht 1.858 m (6' 1.13\")   Wt 135.2 kg (298 lb)   SpO2 98%   BMI 39.18 kg/m       Body mass index is 39.18 kg/m .     General appearance: Patient is alert and fully cooperative with history & exam.  No sign of distress is noted during the " visit.  Psychiatric: Affect is pleasant & appropriate.  Patient appears motivated to improve health.  Respiratory: Breathing is regular & unlabored while sitting.  HEENT: Hearing is intact to spoken word.  Speech is clear.  No gross evidence of visual impairment that would impact ambulation.    Vascular: Dorsalis pedis and posterior tibial pulses are palpable. There is no pedal hair growth bilaterally.  CFT < 3 sec from anterior tibial surface to distal digits bilaterally. There is no appreciable edema noted.  Dermatologic: Long, thick, discolored, dystrophic nails 1 through 5 bilateral feet.  Extremely dry skin on the plantar aspect of both feet.  Turgor and texture are within normal limits. No coloration or temperature changes. No primary or secondary lesions noted.  Neurologic: All epicritic and proprioceptive sensations are grossly intact bilaterally.  Musculoskeletal: All active and passive ankle, subtalar, midtarsal, and 1st MPJ range of motion are grossly intact. Manual muscle strength is within normal limits bilaterally. All dorsiflexors, plantarflexors, invertors, evertors are intact bilaterally.  No tenderness present to bilateral feet or ankles on palpation.  No tenderness to bilateral feet or ankles with range of motion. Calf is soft/non-tender without warmth/induration    Imaging:       No images are attached to the encounter or orders placed in the encounter.     No results found.   No results found.         Javed Amezcua DPM  [facility] Foot & Ankle Surgery/Podiatry

## 2024-07-25 ENCOUNTER — OFFICE VISIT (OUTPATIENT)
Dept: FAMILY MEDICINE | Facility: CLINIC | Age: 54
End: 2024-07-25
Payer: COMMERCIAL

## 2024-07-25 VITALS
SYSTOLIC BLOOD PRESSURE: 122 MMHG | HEIGHT: 73 IN | OXYGEN SATURATION: 97 % | WEIGHT: 304 LBS | HEART RATE: 87 BPM | BODY MASS INDEX: 40.29 KG/M2 | TEMPERATURE: 97.7 F | RESPIRATION RATE: 20 BRPM | DIASTOLIC BLOOD PRESSURE: 77 MMHG

## 2024-07-25 DIAGNOSIS — E66.812 CLASS 2 SEVERE OBESITY DUE TO EXCESS CALORIES WITH SERIOUS COMORBIDITY AND BODY MASS INDEX (BMI) OF 38.0 TO 38.9 IN ADULT (H): ICD-10-CM

## 2024-07-25 DIAGNOSIS — F17.200 NICOTINE DEPENDENCE, UNCOMPLICATED, UNSPECIFIED NICOTINE PRODUCT TYPE: ICD-10-CM

## 2024-07-25 DIAGNOSIS — M65.351 TRIGGER LITTLE FINGER OF RIGHT HAND: ICD-10-CM

## 2024-07-25 DIAGNOSIS — Z87.891 PERSONAL HISTORY OF TOBACCO USE: ICD-10-CM

## 2024-07-25 DIAGNOSIS — E66.01 CLASS 2 SEVERE OBESITY DUE TO EXCESS CALORIES WITH SERIOUS COMORBIDITY AND BODY MASS INDEX (BMI) OF 38.0 TO 38.9 IN ADULT (H): ICD-10-CM

## 2024-07-25 DIAGNOSIS — R82.90 ABNORMAL URINALYSIS: ICD-10-CM

## 2024-07-25 DIAGNOSIS — E11.3299 TYPE 2 DIABETES MELLITUS WITH MILD NONPROLIFERATIVE RETINOPATHY, WITHOUT LONG-TERM CURRENT USE OF INSULIN, MACULAR EDEMA PRESENCE UNSPECIFIED, UNSPECIFIED LATERALITY (H): Primary | ICD-10-CM

## 2024-07-25 DIAGNOSIS — E78.2 MIXED HYPERLIPIDEMIA: ICD-10-CM

## 2024-07-25 LAB
ALBUMIN SERPL BCG-MCNC: 4.7 G/DL (ref 3.5–5.2)
ALBUMIN UR-MCNC: NEGATIVE MG/DL
ALP SERPL-CCNC: 96 U/L (ref 40–150)
ALT SERPL W P-5'-P-CCNC: 24 U/L (ref 0–70)
ANION GAP SERPL CALCULATED.3IONS-SCNC: 10 MMOL/L (ref 7–15)
APPEARANCE UR: ABNORMAL
AST SERPL W P-5'-P-CCNC: 25 U/L (ref 0–45)
BACTERIA #/AREA URNS HPF: ABNORMAL /HPF
BILIRUB SERPL-MCNC: 0.3 MG/DL
BILIRUB UR QL STRIP: NEGATIVE
BUN SERPL-MCNC: 11.4 MG/DL (ref 6–20)
CALCIUM SERPL-MCNC: 9.6 MG/DL (ref 8.8–10.4)
CHLORIDE SERPL-SCNC: 102 MMOL/L (ref 98–107)
CHOLEST SERPL-MCNC: 232 MG/DL
COLOR UR AUTO: YELLOW
CREAT SERPL-MCNC: 0.93 MG/DL (ref 0.67–1.17)
EGFRCR SERPLBLD CKD-EPI 2021: >90 ML/MIN/1.73M2
ERYTHROCYTE [DISTWIDTH] IN BLOOD BY AUTOMATED COUNT: 12.6 % (ref 10–15)
FASTING STATUS PATIENT QL REPORTED: NO
FASTING STATUS PATIENT QL REPORTED: NO
GLUCOSE SERPL-MCNC: 144 MG/DL (ref 70–99)
GLUCOSE UR STRIP-MCNC: NEGATIVE MG/DL
HBA1C MFR BLD: 7.1 % (ref 0–5.6)
HCO3 SERPL-SCNC: 25 MMOL/L (ref 22–29)
HCT VFR BLD AUTO: 46.5 % (ref 40–53)
HDLC SERPL-MCNC: 32 MG/DL
HGB BLD-MCNC: 15.4 G/DL (ref 13.3–17.7)
HGB UR QL STRIP: NEGATIVE
KETONES UR STRIP-MCNC: NEGATIVE MG/DL
LDLC SERPL CALC-MCNC: 144 MG/DL
LEUKOCYTE ESTERASE UR QL STRIP: ABNORMAL
MCH RBC QN AUTO: 29.3 PG (ref 26.5–33)
MCHC RBC AUTO-ENTMCNC: 33.1 G/DL (ref 31.5–36.5)
MCV RBC AUTO: 89 FL (ref 78–100)
MUCOUS THREADS #/AREA URNS LPF: PRESENT /LPF
NITRATE UR QL: NEGATIVE
NONHDLC SERPL-MCNC: 200 MG/DL
PH UR STRIP: 6.5 [PH] (ref 5–8)
PLATELET # BLD AUTO: 236 10E3/UL (ref 150–450)
POTASSIUM SERPL-SCNC: 4 MMOL/L (ref 3.4–5.3)
PROT SERPL-MCNC: 7.6 G/DL (ref 6.4–8.3)
RBC # BLD AUTO: 5.25 10E6/UL (ref 4.4–5.9)
RBC #/AREA URNS AUTO: ABNORMAL /HPF
SODIUM SERPL-SCNC: 137 MMOL/L (ref 135–145)
SP GR UR STRIP: 1.02 (ref 1–1.03)
SQUAMOUS #/AREA URNS AUTO: ABNORMAL /LPF
TRIGL SERPL-MCNC: 278 MG/DL
UROBILINOGEN UR STRIP-ACNC: 1 E.U./DL
WBC # BLD AUTO: 6.8 10E3/UL (ref 4–11)
WBC #/AREA URNS AUTO: ABNORMAL /HPF
WBC CLUMPS #/AREA URNS HPF: PRESENT /HPF

## 2024-07-25 PROCEDURE — 90750 HZV VACC RECOMBINANT IM: CPT | Performed by: FAMILY MEDICINE

## 2024-07-25 PROCEDURE — 90472 IMMUNIZATION ADMIN EACH ADD: CPT | Performed by: FAMILY MEDICINE

## 2024-07-25 PROCEDURE — 90471 IMMUNIZATION ADMIN: CPT | Performed by: FAMILY MEDICINE

## 2024-07-25 PROCEDURE — 99406 BEHAV CHNG SMOKING 3-10 MIN: CPT | Mod: 25 | Performed by: FAMILY MEDICINE

## 2024-07-25 PROCEDURE — 99215 OFFICE O/P EST HI 40 MIN: CPT | Mod: 25 | Performed by: FAMILY MEDICINE

## 2024-07-25 PROCEDURE — 90746 HEPB VACCINE 3 DOSE ADULT IM: CPT | Performed by: FAMILY MEDICINE

## 2024-07-25 PROCEDURE — 81001 URINALYSIS AUTO W/SCOPE: CPT | Performed by: FAMILY MEDICINE

## 2024-07-25 PROCEDURE — G0296 VISIT TO DETERM LDCT ELIG: HCPCS | Performed by: FAMILY MEDICINE

## 2024-07-25 PROCEDURE — 80053 COMPREHEN METABOLIC PANEL: CPT | Performed by: FAMILY MEDICINE

## 2024-07-25 PROCEDURE — 85027 COMPLETE CBC AUTOMATED: CPT | Performed by: FAMILY MEDICINE

## 2024-07-25 PROCEDURE — 36415 COLL VENOUS BLD VENIPUNCTURE: CPT | Performed by: FAMILY MEDICINE

## 2024-07-25 PROCEDURE — 80061 LIPID PANEL: CPT | Performed by: FAMILY MEDICINE

## 2024-07-25 PROCEDURE — 83036 HEMOGLOBIN GLYCOSYLATED A1C: CPT | Performed by: FAMILY MEDICINE

## 2024-07-25 RX ORDER — NICOTINE 21 MG/24HR
1 PATCH, TRANSDERMAL 24 HOURS TRANSDERMAL EVERY 24 HOURS
Qty: 14 PATCH | Refills: 0 | Status: SHIPPED | OUTPATIENT
Start: 2024-09-05 | End: 2024-09-19

## 2024-07-25 RX ORDER — NICOTINE 21 MG/24HR
1 PATCH, TRANSDERMAL 24 HOURS TRANSDERMAL EVERY 24 HOURS
Qty: 42 PATCH | Refills: 0 | Status: SHIPPED | OUTPATIENT
Start: 2024-07-25 | End: 2024-09-05

## 2024-07-25 ASSESSMENT — ENCOUNTER SYMPTOMS: HEADACHES: 1

## 2024-07-25 NOTE — PROGRESS NOTES
Lung Cancer Screening Shared Decision Making Visit     Willian Vickers, a 53 year old male, is eligible for lung cancer screening    History   Smoking Status     Every Day     Types: Other   Smokeless Tobacco     Never       I have discussed with patient the risks and benefits of screening for lung cancer with low-dose CT.     The risks include:    radiation exposure: one low dose chest CT has as much ionizing radiation as about 15 chest x-rays, or 6 months of background radiation living in [place]      false positives: most findings/nodules are NOT cancer, but some might still require additional diagnostic evaluation, including biopsy    over-diagnosis: some slow growing cancers that might never have been clinically significant will be detected and treated unnecessarily     The benefit of early detection of lung cancer is contingent upon adherence to annual screening or more frequent follow up if indicated.     Furthermore, to benefit from screening, Willian must be willing and able to undergo diagnostic procedures, if indicated. Although no specific guide is available for determining severity of comorbidities, it is reasonable to withhold screening in patients who have greater mortality risk from other diseases.     We did discuss that the best way to prevent lung cancer is to not smoke.    Some patients may value a numeric estimation of lung cancer risk when evaluating if lung cancer screening is right for them, here is one calculator:    ShouldIScreen

## 2024-07-25 NOTE — PATIENT INSTRUCTIONS
Lung Cancer Screening   Frequently Asked Questions  If you are at high-risk for lung cancer, getting screened with low-dose computed tomography (LDCT) every year can help save your life. This handout offers answers to some of the most common questions about lung cancer screening. If you have other questions, please call [phone] ([phone]).     What is it?  Lung cancer screening uses special X-ray technology to create an image of your lung tissue. The exam is quick and easy and takes less than 10 seconds. We don t give you any medicine or use any needles. You can eat before and after the exam. You don t need to change your clothes as long as the clothing on your chest doesn t contain metal. But, you do need to be able to hold your breath for at least 6 seconds during the exam.    What is the goal of lung cancer screening?  The goal of lung cancer screening is to save lives. Many times, lung cancer is not found until a person starts having physical symptoms. Lung cancer screening can help detect lung cancer in the earliest stages when it may be easier to treat.    Who should be screened for lung cancer?  We suggest lung cancer screening for anyone who is at high-risk for lung cancer. You are in the high-risk group if you:      are between the ages of 55 and 79, and    have smoked at least 1 pack of cigarettes a day for 20 or more years, and    still smoke or have quit within the past 15 years.    However, if you have a new cough or shortness of breath, you should talk to your doctor before being screened.    Why does it matter if I have symptoms?  Certain symptoms can be a sign that you have a condition in your lungs that should be checked and treated by your doctor. These symptoms include fever, chest pain, a new or changing cough, shortness of breath that you have never felt before, coughing up blood or unexplained weight loss. Having any of these symptoms can greatly affect the results of lung  cancer screening.       Should all smokers get an LDCT lung cancer screening exam?  It depends. Lung cancer screening is for a very specific group of men and women who have a history of heavy smoking over a long period of time (see  Who should be screened for lung cancer  above).  I am in the high-risk group, but have been diagnosed with cancer in the past. Is LDCT lung cancer screening right for me?  In some cases, you should not have LDCT lung screening, such as when your doctor is already following your cancer with CT scan studies. Your doctor will help you decide if LDCT lung screening is right for you.  Do I need to have a screening exam every year?  Yes. If you are in the high-risk group described earlier, you should get an LDCT lung cancer screening exam every year until you are 79, or are no longer willing or able to undergo screening and possible procedures to diagnose and treat lung cancer.  How effective is LDCT at preventing death from lung cancer?  Studies have shown that LDCT lung cancer screening can lower the risk of death from lung cancer by 20 percent in people who are at high-risk.  What are the risks?  There are some risks and limitations of LDCT lung cancer screening. We want to make sure you understand the risks and benefits, so please let us know if you have any questions. Your doctor may want to talk with you more about these risks.    Radiation exposure: As with any exam that uses radiation, there is a very small increased risk of cancer. The amount of radiation in LDCT is small--about the same amount a person would get from a mammogram. Your doctor orders the exam when he or she feels the potential benefits outweigh the risks.    False negatives: No test is perfect, including LDCT. It is possible that you may have a medical condition, including lung cancer, that is not found during your exam. This is called a false negative result.    False positives and more testing: LDCT very often finds  something in the lung that could be cancer, but in fact is not. This is called a false positive result. False positive tests often cause anxiety. To make sure these findings are not cancer, you may need to have more tests. These tests will be done only if you give us permission. Sometimes patients need a treatment that can have side effects, such as a biopsy. For more information on false positives, see  What can I expect from the results?     Findings not related to lung cancer: Your LDCT exam also takes pictures of areas of your body next to your lungs. In a very small number of cases, the CT scan will show an abnormal finding in one of these areas, such as your kidneys, adrenal glands, liver or thyroid. This finding may not be serious, but you may need more tests. Your doctor can help you decide what other tests you may need, if any.  What can I expect from the results?  About 1 out of 4 LDCT exams will find something that may need more tests. Most of the time, these findings are lung nodules. Lung nodules are very small collections of tissue in the lung. These nodules are very common, and the vast majority--more than 97 percent--are not cancer (benign). Most are normal lymph nodes or small areas of scarring from past infections.  But, if a small lung nodule is found to be cancer, the cancer can be cured more than 90 percent of the time. To know if the nodule is cancer, we may need to get more images before your next yearly screening exam. If the nodule has suspicious features (for example, it is large, has an odd shape or grows over time), we will refer you to a specialist for further testing.  Will my doctor also get the results?  Yes. Your doctor will get a copy of your results.  Is it okay to keep smoking now that there s a cancer screening exam?  No. Tobacco is one of the strongest cancer-causing agents. It causes not only lung cancer, but other cancers and cardiovascular (heart) diseases as well. The damage  caused by smoking builds over time. This means that the longer you smoke, the higher your risk of disease. While it is never too late to quit, the sooner you quit, the better.  Where can I find help to quit smoking?  The best way to prevent lung cancer is to stop smoking. If you have already quit smoking, congratulations and keep it up! For help on quitting smoking, please call [employer] at 5-797-QUITNOW (1-740.666.6226) or the American [employer] Society at 1-973.225.8563 to find local resources near you.  One-on-one health coaching:  If you d prefer to work individually with a health care provider on tobacco cessation, we offer:      Medication Therapy Management:  Our specially trained pharmacists work closely with you and your doctor to help you quit smoking.  Call [phone] or [phone] (toll free).        Nicotine Transdermal System   Habitrol, Nicoderm C-Q    Uses  For quitting smoking.    Instructions  DO NOT take this medicine by mouth.    Avoid placing the patch near the breast.    Remove the patch after 24 hours.    Keep the medicine at room temperature. Avoid heat and direct light.    This patch should not be cut.    Wash your hands before and after handling this medicine.    Remove old patch before applying new one. Change the location of the new patch.    If you have vivid dreams or trouble sleeping, you may remove the patch before going to sleep.    Ask your doctor or pharmacist about locations on your body where this patch can be used.    Remove the plastic liner that protects the sticky side of the patch before applying to the skin.    Be sure the area of skin is clean and dry before putting on a new patch.    Apply the patch to a clean, dry, hairless area.    Press the patch firmly for a few seconds to make sure it stays in place.    After removing the patch, fold it together and discard it out of reach of children and pets.    Please ask your doctor or pharmacist how you can safely dispose of  used patches.    If the skin under the patch becomes irritated, remove the patch. Do not apply a new patch to the area until the skin feels better.    To avoid irritating your skin, use a different location for a new patch.    Apply the patch only to normal looking skin. Avoid areas of the skin that are red, have scrapes, or damaged.    If the patch falls off, apply a new a patch on a different location of the body.    Please tell your doctor and pharmacist about all the medicines you take. Include both prescription and over-the-counter medicines. Also tell them about any vitamins, herbal medicines, or anything else you take for your health.    If you need to stop this medicine, your doctor may wish to gradually reduce the dosage before stopping.    Do not use more than 1 patch at any one time.    Cautions  Tell your doctor and pharmacist if you ever had an allergic reaction to a medicine. Symptoms of an allergic reaction can include trouble breathing, skin rash, itching, swelling, or severe dizziness.    Do not use the medication any more than instructed.    Avoid smoking while on this medicine. Smoking may increase your risk for stroke, heart attack, blood clots, high blood pressure, and other diseases of the heart and blood vessels.    Tell the doctor or pharmacist if you are pregnant, planning to be pregnant, or breastfeeding.    Ask your pharmacist if this medicine can interact with any of your other medicines. Be sure to tell them about all the medicines you take.    Please tell all your doctors and dentists that you are on this medicine before they provide care.    Side Effects  The following is a list of some common side effects from this medicine. Please speak with your doctor about what you should do if you experience these or other side effects.    skin irritation where medicine is applied    If you have any of the following side effects, you may be getting too much medicine. Please contact your doctor to  let them know about these side effects.    diarrhea  dizziness  nausea  rapid heartbeat  vomiting    A few people may have an allergic reaction to this medicine. Symptoms can include difficulty breathing, skin rash, itching, swelling, or severe dizziness. If you notice any of these symptoms, seek medical help quickly.    Extra  Please speak with your doctor, nurse, or pharmacist if you have any questions about this medicine.      [email or web address]  IMPORTANT NOTE: This document tells you briefly how to take your medicine, but it does not tell you all there is to know about it. Your doctor or pharmacist may give you other documents about your medicine. Please talk to them if you have any questions. Always follow their advice. There is a more complete description of this medicine available in English. Scan this code on your smartphone or tablet or use the web address below. You can also ask your pharmacist for a printout. If you have any questions, please ask your pharmacist.   2021 [employer].      9578-4962 [employer]. All rights reserved. This information is not intended as a substitute for professional medical care. Always follow your healthcare professional's instructions.

## 2024-07-25 NOTE — PROGRESS NOTES
Assessment & Plan     Type 2 diabetes mellitus with mild nonproliferative retinopathy, without long-term current use of insulin, macular edema presence unspecified, unspecified laterality (H)  A1c stable at 7.1%, encouraged healthy lifestyle changes, resume Ozempic at 0.25 mg weekly, then 0.5 mg weekly then 1 mg weekly.  - HEMOGLOBIN A1C; Future  - semaglutide (OZEMPIC) 2 MG/3ML pen; Inject 0.25 mg subcutaneously every 7 days  - CBC with platelets; Future  - HEMOGLOBIN A1C  - CBC with platelets    Class 2 severe obesity due to excess calories with serious comorbidity and body mass index (BMI) of 38.0 to 38.9 in adult (H)  Encourage patient to continue to engage in healthy lifestyle changes, weight loss program.  - Comprehensive metabolic panel; Future  - Urine Macroscopic with reflex to Microscopic; Future  - Comprehensive metabolic panel  - Urine Macroscopic with reflex to Microscopic  - Urine Microscopic Exam    Mixed hyperlipidemia  Started on Lipitor 20 mg daily.  Continue to monitor lipid continue to work on healthy lifestyle changes  - Lipid Profile (Chol, Trig, HDL, LDL calc); Future  - Lipid Profile (Chol, Trig, HDL, LDL calc)    Personal history of tobacco use    - SMOKING CESSATION COUNSELING 3-10 MIN  - Prof fee: Shared Decision Making for Lung Cancer Screening  - CT Chest Lung Cancer Scrn Low Dose wo; Future    Nicotine dependence, uncomplicated, unspecified nicotine product type  A total of  9  minutes was spent discussing tobacco cessation program and different options treatment.   - [place] Quit Partner Referral; Future  - nicotine (NICODERM CQ) 21 MG/24HR 24 hr patch; Place 1 patch onto the skin every 24 hours for 42 days  - nicotine (NICODERM CQ) 14 MG/24HR 24 hr patch; Place 1 patch onto the skin every 24 hours for 14 days  - nicotine (NICODERM CQ) 7 MG/24HR 24 hr patch; Place 1 patch onto the skin every 24 hours for 14 days    Abnormal urinalysis    - Urine Culture Aerobic Bacterial - lab collect;  "Future    Trigger little finger of right hand  Symptomatic care discussed, consider OT or steroid injection if not improving.            Work on weight loss  Regular exercise    Teddy Dorsey is a 53 year old, presenting for the following health issues:  Hand Pain (Pt stated feel have carpal tunnel right hand keep locking up for a few weeks now ) and Headache (Pt stated been waking up with headache for a few weeks now )      7/25/2024     1:59 PM   Additional Questions   Roomed by Bud     Via the Health Maintenance questionnaire, the patient has reported the following services have been completed -Eye Exam: [facility] 2024-01-17, this information has been sent to the abstraction team.  Headache     History of Present Illness       Headaches:   Since the patient's last clinic visit, headaches are: no change  The patient is getting headaches:  Early morning daily  He is able to do normal daily activities when he has a migraine.  The patient is taking the following rescue/relief medications:  Naproxyn (Aleve)   Patient states \"I get only a small amount of relief\" from the rescue/relief medications.   The patient is taking the following medications to prevent migraines:  No medications to prevent migraines  In the past 4 weeks, the patient has gone to an Urgent Care or Emergency Room 0 times times due to headaches.    Reason for visit:  Stiffness in right hand and headaches  Symptom onset:  1-2 weeks ago  Symptoms include:  Locking of pinky finger and headaches  Symptom intensity:  Moderate  Symptom progression:  Staying the same  Had these symptoms before:  No  What makes it worse:  Use of right hand    He eats 0-1 servings of fruits and vegetables daily.He consumes 0 sweetened beverage(s) daily.He exercises with enough effort to increase his heart rate 9 or less minutes per day.  He exercises with enough effort to increase his heart rate 3 or less days per week.   He is taking medications regularly.   " "  Right fifth digit clicking, having a hard time flexing it sometime.  No injury, no pain.  He had an EMG for his left elbow and wrist , was diagnosed with carpal tunnel he is following up with a specialist.  Couple of weeks of throbbing headaches mostly in the morning, naproxen seems to help, no weakness no phonophobia or photophobia.  Out of Ozempic for almost a month, weight went up.  Has been smoking, he is down to about half a pack a day and interested in quitting smoking, he is also interested on screening for lung cancer, no symptoms.  Diabetes type 2 has been stable, blood sugar usually in the 140s fasting.,  A1c is at 7.1% today.  Urine has been darker lately but no burning or frequency.        Review of Systems  Constitutional, HEENT, cardiovascular, pulmonary, gi and gu systems are negative, except as otherwise noted.      Objective    /77 (BP Location: Right arm, Patient Position: Sitting, Cuff Size: Adult Large)   Pulse 87   Temp 97.7  F (36.5  C) (Temporal)   Resp 20   Ht 1.858 m (6' 1.15\")   Wt 137.9 kg (304 lb)   SpO2 97%   BMI 39.94 kg/m    Body mass index is 39.94 kg/m .  Physical Exam   GENERAL: alert and no distress  NECK: no adenopathy, no asymmetry, masses, or scars  RESP: lungs clear to auscultation - no rales, rhonchi or wheezes  CV: regular rate and rhythm, normal S1 S2, no S3 or S4, no murmur, click or rub, no peripheral edema  ABDOMEN: soft, nontender, no hepatosplenomegaly, no masses and bowel sounds normal  MS: Right fifth trigger finger, but no significant limitation.    Results for orders placed or performed in visit on 07/25/24   HEMOGLOBIN A1C     Status: Abnormal   Result Value Ref Range    Hemoglobin A1C 7.1 (H) 0.0 - 5.6 %   Comprehensive metabolic panel     Status: Abnormal   Result Value Ref Range    Sodium 137 135 - 145 mmol/L    Potassium 4.0 3.4 - 5.3 mmol/L    Carbon Dioxide (CO2) 25 22 - 29 mmol/L    Anion Gap 10 7 - 15 mmol/L    Urea Nitrogen 11.4 6.0 - 20.0 " mg/dL    Creatinine 0.93 0.67 - 1.17 mg/dL    GFR Estimate >90 >60 mL/min/1.73m2    Calcium 9.6 8.8 - 10.4 mg/dL    Chloride 102 98 - 107 mmol/L    Glucose 144 (H) 70 - 99 mg/dL    Alkaline Phosphatase 96 40 - 150 U/L    AST 25 0 - 45 U/L    ALT 24 0 - 70 U/L    Protein Total 7.6 6.4 - 8.3 g/dL    Albumin 4.7 3.5 - 5.2 g/dL    Bilirubin Total 0.3 <=1.2 mg/dL    Patient Fasting > 8hrs? No    CBC with platelets     Status: Normal   Result Value Ref Range    WBC Count 6.8 4.0 - 11.0 10e3/uL    RBC Count 5.25 4.40 - 5.90 10e6/uL    Hemoglobin 15.4 13.3 - 17.7 g/dL    Hematocrit 46.5 40.0 - 53.0 %    MCV 89 78 - 100 fL    MCH 29.3 26.5 - 33.0 pg    MCHC 33.1 31.5 - 36.5 g/dL    RDW 12.6 10.0 - 15.0 %    Platelet Count 236 150 - 450 10e3/uL   Lipid Profile (Chol, Trig, HDL, LDL calc)     Status: Abnormal   Result Value Ref Range    Cholesterol 232 (H) <200 mg/dL    Triglycerides 278 (H) <150 mg/dL    Direct Measure HDL 32 (L) >=40 mg/dL    LDL Cholesterol Calculated 144 (H) <=100 mg/dL    Non HDL Cholesterol 200 (H) <130 mg/dL    Patient Fasting > 8hrs? No     Narrative    Cholesterol  Desirable:  <200 mg/dL    Triglycerides  Normal:  Less than 150 mg/dL  Borderline High:  150-199 mg/dL  High:  200-499 mg/dL  Very High:  Greater than or equal to 500 mg/dL    Direct Measure HDL  Female:  Greater than or equal to 50 mg/dL   Male:  Greater than or equal to 40 mg/dL    LDL Cholesterol  Desirable:  <100mg/dL  Above Desirable:  100-129 mg/dL   Borderline High:  130-159 mg/dL   High:  160-189 mg/dL   Very High:  >= 190 mg/dL    Non HDL Cholesterol  Desirable:  130 mg/dL  Above Desirable:  130-159 mg/dL  Borderline High:  160-189 mg/dL  High:  190-219 mg/dL  Very High:  Greater than or equal to 220 mg/dL   Urine Macroscopic with reflex to Microscopic     Status: Abnormal   Result Value Ref Range    Color Urine Yellow Colorless, Straw, Light Yellow, Yellow    Appearance Urine Slightly Cloudy (A) Clear    Glucose Urine Negative  Negative mg/dL    Bilirubin Urine Negative Negative    Ketones Urine Negative Negative mg/dL    Specific Gravity Urine 1.025 1.005 - 1.030    Blood Urine Negative Negative    pH Urine 6.5 5.0 - 8.0    Protein Albumin Urine Negative Negative mg/dL    Urobilinogen Urine 1.0 0.2, 1.0 E.U./dL    Nitrite Urine Negative Negative    Leukocyte Esterase Urine Trace (A) Negative   Urine Microscopic Exam     Status: Abnormal   Result Value Ref Range    Bacteria Urine Few (A) None Seen /HPF    RBC Urine None Seen 0-2 /HPF /HPF    WBC Urine 10-25 (A) 0-5 /HPF /HPF    Squamous Epithelials Urine Few (A) None Seen /LPF    WBC Clumps Urine Present (A) None Seen /HPF    Mucus Urine Present (A) None Seen /LPF       Prior to immunization administration, verified patients identity using patient s name and date of birth. Please see Immunization Activity for additional information.     Screening Questionnaire for Adult Immunization    Are you sick today?   No   Do you have allergies to medications, food, a vaccine component or latex?   Yes   Have you ever had a serious reaction after receiving a vaccination?   No   Do you have a long-term health problem with heart, lung, kidney, or metabolic disease (e.g., diabetes), asthma, a blood disorder, no spleen, complement component deficiency, a cochlear implant, or a spinal fluid leak?  Are you on long-term aspirin therapy?   Yes   Do you have cancer, leukemia, HIV/AIDS, or any other immune system problem?   No   Do you have a parent, brother, or sister with an immune system problem?   No   In the past 3 months, have you taken medications that affect  your immune system, such as prednisone, other steroids, or anticancer drugs; drugs for the treatment of rheumatoid arthritis, Crohn s disease, or psoriasis; or have you had radiation treatments?   No   Have you had a seizure, or a brain or other nervous system problem?   No   During the past year, have you received a transfusion of blood or blood     products, or been given immune (gamma) globulin or antiviral drug?   No   For women: Are you pregnant or is there a chance you could become       pregnant during the next month?   No   Have you received any vaccinations in the past 4 weeks?   No     Immunization questionnaire was positive for at least one answer.  Notified Dr Fowler.    This note was completed in part using a voice recognition software, any grammatical or context distortion are unintentional and inherent to the software.    Patient instructed to remain in clinic for 15 minutes afterwards, and to report any adverse reactions.     Screening performed by Bud Elaine on 7/25/2024 at 2:03 PM.     Signed Electronically by: Kiana Fowler MD

## 2024-07-25 NOTE — Clinical Note
Please asked the lab to add a UC on the patient urine. Patient also stating that he submitted a Cologuard kit a long time ago, please obtain and abstract the result. Thank you

## 2024-07-26 ENCOUNTER — TELEPHONE (OUTPATIENT)
Dept: FAMILY MEDICINE | Facility: CLINIC | Age: 54
End: 2024-07-26
Payer: COMMERCIAL

## 2024-07-26 PROBLEM — M65.351 TRIGGER LITTLE FINGER OF RIGHT HAND: Status: ACTIVE | Noted: 2024-07-26

## 2024-07-26 NOTE — TELEPHONE ENCOUNTER
----- Message from Kiana Fowler sent at 7/26/2024  8:29 AM CDT -----  Cholesterol still high but improving.  Continue with weight loss program continue to take the medication daily.  Blood sugar is stable.  Awaiting urine culture to see if we need to do an antibiotic or not.

## 2024-08-06 ENCOUNTER — TELEPHONE (OUTPATIENT)
Dept: FAMILY MEDICINE | Facility: CLINIC | Age: 54
End: 2024-08-06
Payer: COMMERCIAL

## 2024-08-06 DIAGNOSIS — M65.351 TRIGGER LITTLE FINGER OF RIGHT HAND: Primary | ICD-10-CM

## 2024-08-06 NOTE — TELEPHONE ENCOUNTER
General Call      Reason for Call:  patient called and would like a referral for PT or Orthopedics on right hand.    Due to cortizone shots needed.    Please contact patient.  Thank you.    What are your questions or concerns:  no    Date of last appointment with provider: July 2024    Okay to leave a detailed message?: Yes at Cell number on file:    Telephone Information:   Mobile [phone]

## 2024-08-14 NOTE — TELEPHONE ENCOUNTER
First attempt: No answer. LVM to call clinic. If pt returns call, please let him know that Dr. Fowler put in an orthopedic referral. Per chart review, he has no appt with ortho yet. Please provide ortho phone [phone] for patient to schedule ortho appt.     Kiana Fowler MD  [facility]8 days ago     GM  Ortho referral was signed  today.     Terell MARIE RN  [facility]

## 2024-08-15 NOTE — TELEPHONE ENCOUNTER
Notified patient via phone. Patient already received cortisone injection yesterday. No further action needed.    Reva Oakes RN  [facility]

## 2024-11-05 ENCOUNTER — OFFICE VISIT (OUTPATIENT)
Dept: PODIATRY | Facility: CLINIC | Age: 54
End: 2024-11-05
Payer: COMMERCIAL

## 2024-11-05 VITALS — BODY MASS INDEX: 39.94 KG/M2 | WEIGHT: 304 LBS | HEART RATE: 103 BPM | OXYGEN SATURATION: 99 %

## 2024-11-05 DIAGNOSIS — L60.2 ONYCHAUXIS: ICD-10-CM

## 2024-11-05 DIAGNOSIS — E11.3299 TYPE 2 DIABETES MELLITUS WITH MILD NONPROLIFERATIVE RETINOPATHY, WITHOUT LONG-TERM CURRENT USE OF INSULIN, MACULAR EDEMA PRESENCE UNSPECIFIED, UNSPECIFIED LATERALITY (H): Primary | ICD-10-CM

## 2024-11-05 DIAGNOSIS — B35.1 ONYCHOMYCOSIS: ICD-10-CM

## 2024-11-05 PROCEDURE — 99213 OFFICE O/P EST LOW 20 MIN: CPT | Mod: 25 | Performed by: PODIATRIST

## 2024-11-05 PROCEDURE — 11721 DEBRIDE NAIL 6 OR MORE: CPT | Performed by: PODIATRIST

## 2024-11-05 ASSESSMENT — PAIN SCALES - GENERAL: PAINLEVEL_OUTOF10: SEVERE PAIN (6)

## 2024-11-05 NOTE — PROGRESS NOTES
FOOT AND ANKLE SURGERY/PODIATRY Progress Note        ASSESSMENT:   Onychomycosis  Onychauxis  DM2        TREATMENT:  Debrided nails 1 through 5 bilateral feet today.  I recommended the patient have his nails treated every 2 months.         HPI:Willian Vickers return to the clinic today with continued complaints of  long thick painful nails both feet.  The patient indicated that he can no longer treat his nails.  They are extremely thick and discolored.  He has a difficult time wearing shoes due to the thickness of the toenails.  This creates pressure and pain on the top of the toes.      Past Medical History:   Diagnosis Date    Diabetes (H)         History reviewed. No pertinent surgical history.    Allergies   Allergen Reactions    Penicillin G Hives and Shortness Of Breath         Current Outpatient Medications:     ammonium lactate (AMLACTIN) 12 % external cream, Apply topically 2 times daily, Disp: 385 g, Rfl: 3    aspirin (ASA) 81 MG chewable tablet, Take 1 tablet (81 mg) by mouth daily, Disp: 90 tablet, Rfl: 3    atorvastatin (LIPITOR) 20 MG tablet, Take 1 tablet (20 mg) by mouth daily, Disp: 90 tablet, Rfl: 3    metFORMIN (GLUCOPHAGE XR) 500 MG 24 hr tablet, Take 1 tablet (500 mg) by mouth 2 times daily (with meals), Disp: 180 tablet, Rfl: 3    semaglutide (OZEMPIC) 2 MG/3ML pen, Inject 0.25 mg subcutaneously every 7 days, Disp: 3 mL, Rfl: 2    History reviewed. No pertinent family history.    Social History     Socioeconomic History    Marital status: Single     Spouse name: Not on file    Number of children: Not on file    Years of education: Not on file    Highest education level: Not on file   Occupational History    Not on file   Tobacco Use    Smoking status: Every Day     Types: Other    Smokeless tobacco: Never    Tobacco comments:     Smoke marijuana   Vaping Use    Vaping status: Never Used   Substance and Sexual Activity    Alcohol use: Not on file    Drug use: Not on file    Sexual activity: Not  on file   Other Topics Concern    Not on file   Social History Narrative    Not on file     Social Drivers of Health     Financial Resource Strain: Low Risk  (4/4/2024)    Financial Resource Strain     Within the past 12 months, have you or your family members you live with been unable to get utilities (heat, electricity) when it was really needed?: No   Food Insecurity: High Risk (4/4/2024)    Food Insecurity     Within the past 12 months, did you worry that your food would run out before you got money to buy more?: Yes     Within the past 12 months, did the food you bought just not last and you didn t have money to get more?: Yes   Transportation Needs: High Risk (4/4/2024)    Transportation Needs     Within the past 12 months, has lack of transportation kept you from medical appointments, getting your medicines, non-medical meetings or appointments, work, or from getting things that you need?: Yes   Physical Activity: Unknown (4/4/2024)    Exercise Vital Sign     Days of Exercise per Week: 0 days     Minutes of Exercise per Session: Not on file   Stress: Stress Concern Present (4/4/2024)    [employer] - Occupational Stress Questionnaire     Feeling of Stress : Very much   Social Connections: Unknown (4/4/2024)    Social Connection and Isolation Panel [NHANES]     Frequency of Communication with Friends and Family: Not on file     Frequency of Social Gatherings with Friends and Family: Three times a week     Attends Rastafarian Services: Not on file     Active Member of Clubs or Organizations: Not on file     Attends Club or Organization Meetings: Not on file     Marital Status: Not on file   Interpersonal Safety: Low Risk  (4/4/2024)    Interpersonal Safety     Do you feel physically and emotionally safe where you currently live?: Yes     Within the past 12 months, have you been hit, slapped, kicked or otherwise physically hurt by someone?: No     Within the past 12 months, have you  been humiliated or emotionally abused in other ways by your partner or ex-partner?: No   Housing Stability: Low Risk  (4/4/2024)    Housing Stability     Do you have housing? : Yes     Are you worried about losing your housing?: No       10 point Review of Systems is negative      Pulse 103   Wt 137.9 kg (304 lb)   SpO2 99%   BMI 39.94 kg/m      BMI= Body mass index is 39.94 kg/m .    OBJECTIVE:  General appearance: Patient is alert and fully cooperative with history & exam.  No sign of distress is noted during the visit.  Vascular: Dorsalis pedis and posterior tibial pulses are palpable. There is no pedal hair growth bilaterally.  CFT < 3 sec from anterior tibial surface to distal digits bilaterally. There is no appreciable edema noted.  Dermatologic: Long, thick, discolored, dystrophic nails 1 through 5 bilateral feet.  Extremely dry skin on the plantar aspect of both feet.  Turgor and texture are within normal limits. No coloration or temperature changes. No primary or secondary lesions noted.  Neurologic: All epicritic and proprioceptive sensations are grossly intact bilaterally.  Musculoskeletal: All active and passive ankle, subtalar, midtarsal, and 1st MPJ range of motion are grossly intact. Manual muscle strength is within normal limits bilaterally. All dorsiflexors, plantarflexors, invertors, evertors are intact bilaterally.  No tenderness present to bilateral feet or ankles on palpation.  No tenderness to bilateral feet or ankles with range of motion. Calf is soft/non-tender without warmth/induration    Imaging:         No results found.         Javed Etienne; PASTOR  [facility] Foot & Ankle Surgery/Podiatry

## 2024-11-05 NOTE — Clinical Note
11/5/2024      Willian Vickers  [address]  [place] [address]      Dear Colleague,    Thank you for referring your patient, Willian Vickers, to the [facility]. Please see a copy of my visit note below.    FOOT AND ANKLE SURGERY/PODIATRY Progress Note        ASSESSMENT:   Onychomycosis  Onychauxis  DM2        TREATMENT:  Debrided nails 1 through 5 bilateral feet today.  I recommended the patient have his nails treated every 2 months.         HPI:Willian Vickers return to the clinic today with continued complaints of  long thick painful nails both feet.  The patient indicated that he can no longer treat his nails.  They are extremely thick and discolored.  He has a difficult time wearing shoes due to the thickness of the toenails.  This creates pressure and pain on the top of the toes.      Past Medical History:   Diagnosis Date    Diabetes (H)         History reviewed. No pertinent surgical history.    Allergies   Allergen Reactions    Penicillin G Hives and Shortness Of Breath         Current Outpatient Medications:     ammonium lactate (AMLACTIN) 12 % external cream, Apply topically 2 times daily, Disp: 385 g, Rfl: 3    aspirin (ASA) 81 MG chewable tablet, Take 1 tablet (81 mg) by mouth daily, Disp: 90 tablet, Rfl: 3    atorvastatin (LIPITOR) 20 MG tablet, Take 1 tablet (20 mg) by mouth daily, Disp: 90 tablet, Rfl: 3    metFORMIN (GLUCOPHAGE XR) 500 MG 24 hr tablet, Take 1 tablet (500 mg) by mouth 2 times daily (with meals), Disp: 180 tablet, Rfl: 3    semaglutide (OZEMPIC) 2 MG/3ML pen, Inject 0.25 mg subcutaneously every 7 days, Disp: 3 mL, Rfl: 2    History reviewed. No pertinent family history.    Social History     Socioeconomic History    Marital status: Single     Spouse name: Not on file    Number of children: Not on file    Years of education: Not on file    Highest education level: Not on file   Occupational History    Not on file   Tobacco Use    Smoking status:  Every Day     Types: Other    Smokeless tobacco: Never    Tobacco comments:     Smoke marijuana   Vaping Use    Vaping status: Never Used   Substance and Sexual Activity    Alcohol use: Not on file    Drug use: Not on file    Sexual activity: Not on file   Other Topics Concern    Not on file   Social History Narrative    Not on file     Social Drivers of Health     Financial Resource Strain: Low Risk  (4/4/2024)    Financial Resource Strain     Within the past 12 months, have you or your family members you live with been unable to get utilities (heat, electricity) when it was really needed?: No   Food Insecurity: High Risk (4/4/2024)    Food Insecurity     Within the past 12 months, did you worry that your food would run out before you got money to buy more?: Yes     Within the past 12 months, did the food you bought just not last and you didn t have money to get more?: Yes   Transportation Needs: High Risk (4/4/2024)    Transportation Needs     Within the past 12 months, has lack of transportation kept you from medical appointments, getting your medicines, non-medical meetings or appointments, work, or from getting things that you need?: Yes   Physical Activity: Unknown (4/4/2024)    Exercise Vital Sign     Days of Exercise per Week: 0 days     Minutes of Exercise per Session: Not on file   Stress: Stress Concern Present (4/4/2024)    [employer] [employer] - Occupational Stress Questionnaire     Feeling of Stress : Very much   Social Connections: Unknown (4/4/2024)    Social Connection and Isolation Panel [NHANES]     Frequency of Communication with Friends and Family: Not on file     Frequency of Social Gatherings with Friends and Family: Three times a week     Attends Yazidism Services: Not on file     Active Member of Clubs or Organizations: Not on file     Attends Club or Organization Meetings: Not on file     Marital Status: Not on file   Interpersonal Safety: Low Risk  (4/4/2024)     Interpersonal Safety     Do you feel physically and emotionally safe where you currently live?: Yes     Within the past 12 months, have you been hit, slapped, kicked or otherwise physically hurt by someone?: No     Within the past 12 months, have you been humiliated or emotionally abused in other ways by your partner or ex-partner?: No   Housing Stability: Low Risk  (4/4/2024)    Housing Stability     Do you have housing? : Yes     Are you worried about losing your housing?: No       10 point Review of Systems is negative      Pulse 103   Wt 137.9 kg (304 lb)   SpO2 99%   BMI 39.94 kg/m      BMI= Body mass index is 39.94 kg/m .    OBJECTIVE:  General appearance: Patient is alert and fully cooperative with history & exam.  No sign of distress is noted during the visit.  Vascular: Dorsalis pedis and posterior tibial pulses are palpable. There is no pedal hair growth bilaterally.  CFT < 3 sec from anterior tibial surface to distal digits bilaterally. There is no appreciable edema noted.  Dermatologic: Long, thick, discolored, dystrophic nails 1 through 5 bilateral feet.  Extremely dry skin on the plantar aspect of both feet.  Turgor and texture are within normal limits. No coloration or temperature changes. No primary or secondary lesions noted.  Neurologic: All epicritic and proprioceptive sensations are grossly intact bilaterally.  Musculoskeletal: All active and passive ankle, subtalar, midtarsal, and 1st MPJ range of motion are grossly intact. Manual muscle strength is within normal limits bilaterally. All dorsiflexors, plantarflexors, invertors, evertors are intact bilaterally.  No tenderness present to bilateral feet or ankles on palpation.  No tenderness to bilateral feet or ankles with range of motion. Calf is soft/non-tender without warmth/induration    Imaging:         No results found.         Javed Etienne; PASTOR  [facility] Foot & Ankle Surgery/Podiatry         Again, thank you for allowing me to  participate in the care of your patient.        Sincerely,        Javed Etienne DPM

## 2024-11-26 ENCOUNTER — TELEPHONE (OUTPATIENT)
Dept: FAMILY MEDICINE | Facility: CLINIC | Age: 54
End: 2024-11-26
Payer: COMMERCIAL

## 2024-11-26 NOTE — TELEPHONE ENCOUNTER
Reason for Call:  Appointment Request    Patient requesting this type of appt:  Office Visit    Requested provider:  Any    When does patient want to be seen/preferred time:  12/4/24 at 0800    Comments: Reschedule for a sooner date    Terell MARIE RN  [facility]

## 2025-04-24 ENCOUNTER — OFFICE VISIT (OUTPATIENT)
Dept: PODIATRY | Facility: CLINIC | Age: 55
End: 2025-04-24
Payer: COMMERCIAL

## 2025-04-24 DIAGNOSIS — E11.42 DIABETIC POLYNEUROPATHY ASSOCIATED WITH TYPE 2 DIABETES MELLITUS (H): Primary | ICD-10-CM

## 2025-04-24 DIAGNOSIS — B35.1 ONYCHOMYCOSIS: ICD-10-CM

## 2025-04-24 DIAGNOSIS — I73.9 PAD (PERIPHERAL ARTERY DISEASE): ICD-10-CM

## 2025-04-24 DIAGNOSIS — L85.3 XEROSIS CUTIS: ICD-10-CM

## 2025-04-24 DIAGNOSIS — L85.3 DRY SKIN: ICD-10-CM

## 2025-04-24 DIAGNOSIS — L84 CALLUS: ICD-10-CM

## 2025-04-24 DIAGNOSIS — G64 PERIPHERAL NERVOUS SYSTEM DISEASE: ICD-10-CM

## 2025-04-24 DIAGNOSIS — L60.2 ONYCHAUXIS: ICD-10-CM

## 2025-04-24 RX ORDER — UREA 200 MG/G
CREAM TOPICAL PRN
Qty: 113 G | Refills: 1 | Status: SHIPPED | OUTPATIENT
Start: 2025-04-24

## 2025-04-24 ASSESSMENT — PAIN SCALES - GENERAL: PAINLEVEL_OUTOF10: MODERATE PAIN (6)

## 2025-04-24 NOTE — PROGRESS NOTES
FOOT AND ANKLE SURGERY/PODIATRY PROGRESS NOTE    ASSESSMENT:   - Type 2 diabetes with peripheral neuropathy  - Painful onychomycosis x 10  - Hyperkeratoses x 6  - Xerosis with superficial fissuring, bilateral feet    PLAN:  - A1c was evaluated.    - Type 2 diabetes with peripheral neuropathy:  Discussed the patient's type 2 diabetes.  Discussed the patient's peripheral neuropathy.  Discussed hyperglycemia, A1c, and its effect on the peripheral nerves especially the nerves to the eyes and toes.  Discussed pedal foot checks and what to look for.  Discussed shear force first ground reactive forces.  Discussed that this is how ulcerations typically begin to start.  Discussed the importance with diabetic shoes and inserts.  Discussed the importance of an accommodative multilayered insert for this.  Diabetic shoes and inserts were ordered for the patient today.    - Xerosis, bilateral feet:  Discussed the dry flaky skin.  Discussed my fear with his fissuring, peripheral neuropathy, and mild PAD.  Discussed that he is at risk for ulceration and this needs to be handled.  Urea 20% cream was ordered for the patient today.  This to be applied daily to the plantar aspect of the feet.    - Onychomycosis x 10:  After verbal and written consent were obtained, all painful toenails, x 10, were manually debrided with sterile nail nippers without complication.  Patient Toller procedure well.    - Hyperkeratosis x 6:  After verbal and written consent were obtained, the hyperkeratotic tissue to the feet were trimmed with a sterile 10 blade.  No ulcerations, abrasions, or lacerations were noted post procedure.  Patient Toller procedure well and noted immediate relief.    - Patient's questions invited and answered. Patient to return to clinic in 12 week(s) for re-evaluation. Patient was encouraged to call my office with any further questions or concerns.     Nathanael Gautam DPM  [facility] Podiatry/Foot & Ankle  Surgery      SUBJECTIVE: Willian Vickers was seen in clinic today for diabetic foot evaluation.  He states that he has elongated nails that he has difficulty cutting and they cause him pain.  He is worried about them falling off and causing ulceration.  Additionally he has dry flaky skin that is cracking on the bottom of his feet causing him pain.  Additionally he has thick calluses to the bottom of his feet that he has difficulty feeling but then when he steps down or stands for long period of time they begin to ache.  He is worried that these will become ulcerations as well.    Past Medical History:   Diagnosis Date    Diabetes (H)        History reviewed. No pertinent surgical history.    Allergies   Allergen Reactions    Penicillin G Hives and Shortness Of Breath         Current Outpatient Medications:     ammonium lactate (AMLACTIN) 12 % external cream, Apply topically 2 times daily, Disp: 385 g, Rfl: 3    aspirin (ASA) 81 MG chewable tablet, Take 1 tablet (81 mg) by mouth daily, Disp: 90 tablet, Rfl: 3    atorvastatin (LIPITOR) 20 MG tablet, Take 1 tablet (20 mg) by mouth daily, Disp: 90 tablet, Rfl: 3    metFORMIN (GLUCOPHAGE XR) 500 MG 24 hr tablet, Take 1 tablet (500 mg) by mouth 2 times daily (with meals), Disp: 180 tablet, Rfl: 3    semaglutide (OZEMPIC) 2 MG/3ML pen, Inject 0.25 mg subcutaneously every 7 days, Disp: 3 mL, Rfl: 2    urea (GORMEL) 20 % external cream, Apply topically as needed (dry skin)., Disp: 113 g, Rfl: 1    History reviewed. No pertinent family history.    Social History     Socioeconomic History    Marital status: Single     Spouse name: Not on file    Number of children: Not on file    Years of education: Not on file    Highest education level: Not on file   Occupational History    Not on file   Tobacco Use    Smoking status: Every Day     Types: Other    Smokeless tobacco: Never    Tobacco comments:     Smoke marijuana   Vaping Use    Vaping status: Never Used   Substance and  Sexual Activity    Alcohol use: Not on file    Drug use: Not on file    Sexual activity: Not on file   Other Topics Concern    Not on file   Social History Narrative    Not on file     Social Drivers of Health     Financial Resource Strain: Low Risk  (4/4/2024)    Financial Resource Strain     Within the past 12 months, have you or your family members you live with been unable to get utilities (heat, electricity) when it was really needed?: No   Food Insecurity: High Risk (4/4/2024)    Food Insecurity     Within the past 12 months, did you worry that your food would run out before you got money to buy more?: Yes     Within the past 12 months, did the food you bought just not last and you didn t have money to get more?: Yes   Transportation Needs: High Risk (4/4/2024)    Transportation Needs     Within the past 12 months, has lack of transportation kept you from medical appointments, getting your medicines, non-medical meetings or appointments, work, or from getting things that you need?: Yes   Physical Activity: Unknown (4/4/2024)    Exercise Vital Sign     Days of Exercise per Week: 0 days     Minutes of Exercise per Session: Not on file   Stress: Stress Concern Present (4/4/2024)    [employer] - Occupational Stress Questionnaire     Feeling of Stress : Very much   Social Connections: Unknown (4/4/2024)    Social Connection and Isolation Panel [NHANES]     Frequency of Communication with Friends and Family: Not on file     Frequency of Social Gatherings with Friends and Family: Three times a week     Attends [employer] Services: Not on file     Active Member of Clubs or Organizations: Not on file     Attends Club or Organization Meetings: Not on file     Marital Status: Not on file   Interpersonal Safety: Low Risk  (4/4/2024)    Interpersonal Safety     Do you feel physically and emotionally safe where you currently live?: Yes     Within the past 12 months, have you been hit, slapped,  kicked or otherwise physically hurt by someone?: No     Within the past 12 months, have you been humiliated or emotionally abused in other ways by your partner or ex-partner?: No   Housing Stability: Low Risk  (4/4/2024)    Housing Stability     Do you have housing? : Yes     Are you worried about losing your housing?: No       10 point Review of Systems is negative except otherwise noted in HPI.    OBJECTIVE:  Vascular: Dorsalis pedis and posterior tibial pulses are 1/4, bilaterally. Pedal hair growth absent.  CFT < 3 sec from anterior tibial surface to distal digits.  Mild nonpitting edema noted to the feet ankles bilaterally..    Dermatologic: Skin is thin, dry, atrophic, with xerotic flaking to the dorsum and plantar of the feet bilaterally.  Small fissures noted down to dermis plantarly.  Varicosities noted to the hindfoot and ankles bilaterally.  Hyperkeratotic tissue noted to the plantar aspect of the 1st and 5th metatarsal heads of the right foot as well as the 1st, 4th and 5th metatarsal heads of the left foot.  Porokeratosis noted to the central lateral midfoot of the right foot.    Neurologic: Light touch sensation intact.  Proprioception diminished to the digits and plantar aspect of the metatarsal heads, bilaterally.    Musculoskeletal: Tenderness palpation to the distal aspect of the thickened elongated nails x 10.  Pain palpation of the hyperkeratoses x 5.  Calf is soft and compressible.    Labs: A1c 7.1% 9 months ago

## 2025-04-24 NOTE — LETTER
4/24/2025      Willian Vickers  [address]  [address]  [place] [address]      Dear Colleague,    Thank you for referring your patient, Willian Vickers, to the [facility]. Please see a copy of my visit note below.        FOOT AND ANKLE SURGERY/PODIATRY PROGRESS NOTE    ASSESSMENT:   - Type 2 diabetes with peripheral neuropathy  - Painful onychomycosis x 10  - Hyperkeratoses x 6  - Xerosis with superficial fissuring, bilateral feet    PLAN:  - A1c was evaluated.    - Type 2 diabetes with peripheral neuropathy:  Discussed the patient's type 2 diabetes.  Discussed the patient's peripheral neuropathy.  Discussed hyperglycemia, A1c, and its effect on the peripheral nerves especially the nerves to the eyes and toes.  Discussed pedal foot checks and what to look for.  Discussed shear force first ground reactive forces.  Discussed that this is how ulcerations typically begin to start.  Discussed the importance with diabetic shoes and inserts.  Discussed the importance of an accommodative multilayered insert for this.  Diabetic shoes and inserts were ordered for the patient today.    - Xerosis, bilateral feet:  Discussed the dry flaky skin.  Discussed my fear with his fissuring, peripheral neuropathy, and mild PAD.  Discussed that he is at risk for ulceration and this needs to be handled.  Urea 20% cream was ordered for the patient today.  This to be applied daily to the plantar aspect of the feet.    - Onychomycosis x 10:  After verbal and written consent were obtained, all painful toenails, x 10, were manually debrided with sterile nail nippers without complication.  Patient Toller procedure well.    - Hyperkeratosis x 6:  After verbal and written consent were obtained, the hyperkeratotic tissue to the feet were trimmed with a sterile 10 blade.  No ulcerations, abrasions, or lacerations were noted post procedure.  Patient Toller procedure well and noted immediate relief.    - Patient's questions  invited and answered. Patient to return to clinic in 12 week(s) for re-evaluation. Patient was encouraged to call my office with any further questions or concerns.     Nathanael Gautam DPM  [facility] Podiatry/Foot & Ankle Surgery      SUBJECTIVE: Willian Vickers was seen in clinic today for diabetic foot evaluation.  He states that he has elongated nails that he has difficulty cutting and they cause him pain.  He is worried about them falling off and causing ulceration.  Additionally he has dry flaky skin that is cracking on the bottom of his feet causing him pain.  Additionally he has thick calluses to the bottom of his feet that he has difficulty feeling but then when he steps down or stands for long period of time they begin to ache.  He is worried that these will become ulcerations as well.    Past Medical History:   Diagnosis Date     Diabetes (H)        History reviewed. No pertinent surgical history.    Allergies   Allergen Reactions     Penicillin G Hives and Shortness Of Breath         Current Outpatient Medications:      ammonium lactate (AMLACTIN) 12 % external cream, Apply topically 2 times daily, Disp: 385 g, Rfl: 3     aspirin (ASA) 81 MG chewable tablet, Take 1 tablet (81 mg) by mouth daily, Disp: 90 tablet, Rfl: 3     atorvastatin (LIPITOR) 20 MG tablet, Take 1 tablet (20 mg) by mouth daily, Disp: 90 tablet, Rfl: 3     metFORMIN (GLUCOPHAGE XR) 500 MG 24 hr tablet, Take 1 tablet (500 mg) by mouth 2 times daily (with meals), Disp: 180 tablet, Rfl: 3     semaglutide (OZEMPIC) 2 MG/3ML pen, Inject 0.25 mg subcutaneously every 7 days, Disp: 3 mL, Rfl: 2     urea (GORMEL) 20 % external cream, Apply topically as needed (dry skin)., Disp: 113 g, Rfl: 1    History reviewed. No pertinent family history.    Social History     Socioeconomic History     Marital status: Single     Spouse name: Not on file     Number of children: Not on file     Years of education: Not on file     Highest education level: Not  on file   Occupational History     Not on file   Tobacco Use     Smoking status: Every Day     Types: Other     Smokeless tobacco: Never     Tobacco comments:     Smoke marijuana   Vaping Use     Vaping status: Never Used   Substance and Sexual Activity     Alcohol use: Not on file     Drug use: Not on file     Sexual activity: Not on file   Other Topics Concern     Not on file   Social History Narrative     Not on file     Social Drivers of Health     Financial Resource Strain: Low Risk  (4/4/2024)    Financial Resource Strain      Within the past 12 months, have you or your family members you live with been unable to get utilities (heat, electricity) when it was really needed?: No   Food Insecurity: High Risk (4/4/2024)    Food Insecurity      Within the past 12 months, did you worry that your food would run out before you got money to buy more?: Yes      Within the past 12 months, did the food you bought just not last and you didn t have money to get more?: Yes   Transportation Needs: High Risk (4/4/2024)    Transportation Needs      Within the past 12 months, has lack of transportation kept you from medical appointments, getting your medicines, non-medical meetings or appointments, work, or from getting things that you need?: Yes   Physical Activity: Unknown (4/4/2024)    Exercise Vital Sign      Days of Exercise per Week: 0 days      Minutes of Exercise per Session: Not on file   Stress: Stress Concern Present (4/4/2024)    [employer] - Occupational Stress Questionnaire      Feeling of Stress : Very much   Social Connections: Unknown (4/4/2024)    Social Connection and Isolation Panel [NHANES]      Frequency of Communication with Friends and Family: Not on file      Frequency of Social Gatherings with Friends and Family: Three times a week      Attends Worship Services: Not on file      Active Member of Clubs or Organizations: Not on file      Attends Club or Organization  Meetings: Not on file      Marital Status: Not on file   Interpersonal Safety: Low Risk  (4/4/2024)    Interpersonal Safety      Do you feel physically and emotionally safe where you currently live?: Yes      Within the past 12 months, have you been hit, slapped, kicked or otherwise physically hurt by someone?: No      Within the past 12 months, have you been humiliated or emotionally abused in other ways by your partner or ex-partner?: No   Housing Stability: Low Risk  (4/4/2024)    Housing Stability      Do you have housing? : Yes      Are you worried about losing your housing?: No       10 point Review of Systems is negative except otherwise noted in HPI.    OBJECTIVE:  Vascular: Dorsalis pedis and posterior tibial pulses are 1/4, bilaterally. Pedal hair growth absent.  CFT < 3 sec from anterior tibial surface to distal digits.  Mild nonpitting edema noted to the feet ankles bilaterally..    Dermatologic: Skin is thin, dry, atrophic, with xerotic flaking to the dorsum and plantar of the feet bilaterally.  Small fissures noted down to dermis plantarly.  Varicosities noted to the hindfoot and ankles bilaterally.  Hyperkeratotic tissue noted to the plantar aspect of the 1st and 5th metatarsal heads of the right foot as well as the 1st, 4th and 5th metatarsal heads of the left foot.  Porokeratosis noted to the central lateral midfoot of the right foot.    Neurologic: Light touch sensation intact.  Proprioception diminished to the digits and plantar aspect of the metatarsal heads, bilaterally.    Musculoskeletal: Tenderness palpation to the distal aspect of the thickened elongated nails x 10.  Pain palpation of the hyperkeratoses x 5.  Calf is soft and compressible.    Labs: A1c 7.1% 9 months ago      Again, thank you for allowing me to participate in the care of your patient.        Sincerely,        Richie Gautam DPM    Electronically signed

## 2025-04-24 NOTE — PATIENT INSTRUCTIONS
What are Prescription Custom Orthotics?  Custom orthotics are specially-made devices designed to support and comfort your feet. Prescription orthotics are crafted for you and no one else. They match the contours of your feet precisely and are designed for the way you move. Orthotics are only manufactured after a podiatrist has conducted a complete evaluation of your feet, ankles, and legs, so the orthotic can accommodate your unique foot structure and pathology.  Prescription orthotics are divided into two categories:  Functional orthotics are designed to control abnormal motion. They may be used to treat foot pain caused by abnormal motion; they can also be used to treat injuries such as shin splints or tendinitis. Functional orthotics are usually crafted of a semi-rigid material such as plastic or graphite.  Accommodative orthotics are softer and meant to provide additional cushioning and support. They can be used to treat diabetic foot ulcers, painful calluses on the bottom of the foot, and other uncomfortable conditions.  Podiatrists use orthotics to treat foot problems such as plantar fasciitis, bursitis, tendinitis, diabetic foot ulcers, and foot, ankle, and heel pain. Clinical research studies have shown that podiatrist-prescribed foot orthotics decrease foot pain and improve function.  Orthotics typically cost more than shoe inserts purchased in a retail store, but the additional cost is usually well worth it. Unlike shoe inserts, orthotics are molded to fit each individual foot, so you can be sure that your orthotics fit and do what they're supposed to do. Prescription orthotics are also made of top-notch materials and last many years when cared for properly. Insurance often helps pay for prescription orthotics.  What are Shoe Inserts?   You've seen them at the grocery store and at the mall. You've probably even seen them on TV and online. Shoe inserts are any kind of non-prescription foot support designed  to be worn inside a shoe. Pre-packaged, mass produced, arch supports are shoe inserts. So are the  custom-made  insoles and foot supports that you can order online or at retail stores. Unless the device has been prescribed by a doctor and crafted for your specific foot, it's a shoe insert, not a custom orthotic device--despite what the ads might say.  Shoe inserts can be very helpful for a variety of foot ailments, including flat arches and foot and leg pain. They can cushion your feet, provide comfort, and support your arches, but they can't correct biomechanical foot problems or cure long-standing foot issues.  The most common types of shoe inserts are:  Arch supports: Some people have high arches. Others have low arches or flat feet. Arch supports generally have a  bumped-up  appearance and are designed to support the foot's natural arch.   Insoles: Insoles slip into your shoe to provide extra cushioning and support. Insoles are often made of gel, foam, or plastic.   Heel liners: Heel liners, sometimes called heel pads or heel cups, provide extra cushioning in the heel region. They may be especially useful for patients who have foot pain caused by age-related thinning of the heels' natural fat pads.   Foot cushions: Do your shoes rub against your heel or your toes? Foot cushions come in many different shapes and sizes and can be used as a barrier between you and your shoe.  Choosing an Over-the-Counter Shoe Insert  Selecting a shoe insert from the wide variety of devices on the market can be overwhelming. Here are some podiatrist-tested tips to help you find the insert that best meets your needs:  Consider your health. Do you have diabetes? Problems with circulation? An over-the-counter insert may not be your best bet. Diabetes and poor circulation increase your risk of foot ulcers and infections, so schedule an appointment with a podiatrist. He or she can help you select a solution that won't cause additional  health problems.   Think about the purpose. Are you planning to run a marathon, or do you just need a little arch support in your work shoes? Look for a product that fits your planned level of activity.   Bring your shoes. For the insert to be effective, it has to fit into your shoes. So bring your sneakers, dress shoes, or work boots--whatever you plan to wear with your insert. Look for an insert that will fit the contours of your shoe.   Try them on. If all possible, slip the insert into your shoe and try it out. Walk around a little. How does it feel? Don't assume that feelings of pressure will go away with continued wear. (If you can't try the inserts at the store, ask about the store's return policy and hold on to your receipt.)    Please call one of the [facility] locations below to schedule an appointment. If you received a prescription please bring it with you to your appointment. Some locations are limited to what they carry.    Office Locations    [facility]  [address] [place] [address]  Home Medical Equipment, [address]   Phone: [phone]   Orthotics and Prosthetics, [address]   Phone: [phone]    [facility]   Home Medical Equipment   [address], [place] [address]  Phone :[phone]  Orthotics and Prosthetics   [address], [address], [facility] [address]  Phone:[phone]          [facility]  [address]  [address]   [place] [address]   Phone: [phone]    [facility]  [address]  [place] [address]  Phone: [phone]    [facility]  [address]  [place] [address]  Phone: [phone]  Fax: [phone]    [facility]   [address]  [place] [address]  Phone:  [phone]    [facility]  [address]  [place] [address]  Phone: [phone]    [facility]  [address]  [place] [address]  Phone: [phone]    [place]   [address] [place] [address]   Phone: [phone]    WEARING YOUR CUSTOM FOOT ORTHOTICS   Most insurance plans cover one pair of orthotics per year. You must check with your   insurance plan to see what your payment responsibility will be. Please call your   insurance company by calling the number on the back of your insurance card.   Orthotic's are non-refundable and non-returnable.   Orthotics are made of various designs. Some orthotics are covered with material that extends beyond your toes. If your orthotic is of this design, you will likely need to trim the toe end to get a proper fit. The insole from your shoe can be used as a template. Simply overlay the shoe insert on top of the custom orthotic. Align the heel end while tracing the length of the insert onto the custom orthotic. Use a large scissor to trim the toe end until you get a proper fit in the shoe.   The orthotic needs to be pushed as far back in the shoe as possible. The heel portion should not ride forward so as not to irritate your heel.   Orthotics are designed to work with socks. Excessive perspiration will shorten the life span of the orthotics. Remove the orthotic from the shoe frequently for proper drying.   The break-in period lasts for weeks. People new to orthotics will likely experience new aches and pains. The orthotic is forcing your foot into a new position. Arch, foot and leg muscle aches and fatigue are common during these weeks. Minor discomfort can be considered normal break in phenomenon. Start wearing your orthotic around your home your first day. Limited activity for one to two hours is recommended. You can increase one or two additional hours each  day provided the aches and pains are subsiding. The degree of discomfort, fatigue and problems will dictate the speed of break in. You may require multiple weeks to work up to full time use.   Do not continue wearing your orthotics if they are creating problems such as blisters or sores. Do not hesitate to call the clinic to speak with a nurse regarding orthotic   break in, fit, trimming, etc. You may also need to see the doctor if the orthotics are   simply not working out. Adjustments are sometimes made to improve orthotic   function.     Orthotics will only work in certain styles and types of shoes. Orthotics rarely work in dress shoes. Slip-ons, clogs, sandals and heels are particularly troublesome. Specially designed orthotics may be necessary for these types of shoes. Your custom orthotic was designed for activities that require appropriate walking or running shoes. Lace up athletic shoes, walking shoes or work boots should work appropriately. You may need a wider or longer shoe. Shoes with a removable  or insert work best. In general, you want to remove an insert from the shoe before placing the orthotic into the shoe. Shoes without a removable liner may not work as well.     When purchasing new shoes, bring your orthotics along to get a proper fit. Shop at stores that are familiar with orthotics.   Frequent washing of the orthotic may shorten the life span of the top cover. The top cover can be replaced but will generally last one to five years depending on use and foot perspiration.

## 2025-06-04 ENCOUNTER — OFFICE VISIT (OUTPATIENT)
Dept: FAMILY MEDICINE | Facility: CLINIC | Age: 55
End: 2025-06-04
Payer: COMMERCIAL

## 2025-06-04 ENCOUNTER — ORDERS ONLY (AUTO-RELEASED) (OUTPATIENT)
Dept: FAMILY MEDICINE | Facility: CLINIC | Age: 55
End: 2025-06-04

## 2025-06-04 VITALS
TEMPERATURE: 97.4 F | BODY MASS INDEX: 39.76 KG/M2 | HEART RATE: 95 BPM | OXYGEN SATURATION: 98 % | WEIGHT: 300 LBS | HEIGHT: 73 IN | RESPIRATION RATE: 15 BRPM | SYSTOLIC BLOOD PRESSURE: 133 MMHG | DIASTOLIC BLOOD PRESSURE: 84 MMHG

## 2025-06-04 DIAGNOSIS — E11.42 TYPE 2 DIABETES MELLITUS WITH DIABETIC POLYNEUROPATHY, WITHOUT LONG-TERM CURRENT USE OF INSULIN (H): ICD-10-CM

## 2025-06-04 DIAGNOSIS — Z12.11 SCREEN FOR COLON CANCER: ICD-10-CM

## 2025-06-04 DIAGNOSIS — M25.561 CHRONIC PAIN OF BOTH KNEES: ICD-10-CM

## 2025-06-04 DIAGNOSIS — Z00.00 ROUTINE GENERAL MEDICAL EXAMINATION AT A HEALTH CARE FACILITY: Primary | ICD-10-CM

## 2025-06-04 DIAGNOSIS — G89.29 CHRONIC PAIN OF BOTH KNEES: ICD-10-CM

## 2025-06-04 DIAGNOSIS — Z59.41 FOOD INSECURITY: ICD-10-CM

## 2025-06-04 DIAGNOSIS — M17.0 ARTHRITIS OF BOTH KNEES: ICD-10-CM

## 2025-06-04 DIAGNOSIS — F43.12 CHRONIC POST-TRAUMATIC STRESS DISORDER: ICD-10-CM

## 2025-06-04 DIAGNOSIS — Z59.82 TRANSPORTATION INSECURITY: ICD-10-CM

## 2025-06-04 DIAGNOSIS — M25.562 CHRONIC PAIN OF BOTH KNEES: ICD-10-CM

## 2025-06-04 PROBLEM — M15.9 GENERALIZED OSTEOARTHRITIS: Status: ACTIVE | Noted: 2025-06-04

## 2025-06-04 PROBLEM — F33.9 RECURRENT MAJOR DEPRESSION: Status: ACTIVE | Noted: 2025-06-04

## 2025-06-04 PROBLEM — F43.20 ADJUSTMENT REACTION: Status: ACTIVE | Noted: 2025-06-04

## 2025-06-04 PROBLEM — E78.5 HYPERLIPIDEMIA: Status: ACTIVE | Noted: 2025-06-04

## 2025-06-04 LAB
EST. AVERAGE GLUCOSE BLD GHB EST-MCNC: 163 MG/DL
HBA1C MFR BLD: 7.3 % (ref 0–5.6)

## 2025-06-04 PROCEDURE — 82043 UR ALBUMIN QUANTITATIVE: CPT

## 2025-06-04 PROCEDURE — G2211 COMPLEX E/M VISIT ADD ON: HCPCS

## 2025-06-04 PROCEDURE — 99214 OFFICE O/P EST MOD 30 MIN: CPT | Mod: 25

## 2025-06-04 PROCEDURE — 3075F SYST BP GE 130 - 139MM HG: CPT

## 2025-06-04 PROCEDURE — 90471 IMMUNIZATION ADMIN: CPT

## 2025-06-04 PROCEDURE — 80061 LIPID PANEL: CPT

## 2025-06-04 PROCEDURE — 90750 HZV VACC RECOMBINANT IM: CPT

## 2025-06-04 PROCEDURE — 84460 ALANINE AMINO (ALT) (SGPT): CPT

## 2025-06-04 PROCEDURE — 83036 HEMOGLOBIN GLYCOSYLATED A1C: CPT

## 2025-06-04 PROCEDURE — 36415 COLL VENOUS BLD VENIPUNCTURE: CPT

## 2025-06-04 PROCEDURE — 3079F DIAST BP 80-89 MM HG: CPT

## 2025-06-04 PROCEDURE — 99396 PREV VISIT EST AGE 40-64: CPT | Mod: 25

## 2025-06-04 PROCEDURE — 3050F LDL-C >= 130 MG/DL: CPT

## 2025-06-04 PROCEDURE — 3051F HG A1C>EQUAL 7.0%<8.0%: CPT

## 2025-06-04 PROCEDURE — 82570 ASSAY OF URINE CREATININE: CPT

## 2025-06-04 RX ORDER — ASPIRIN 81 MG/1
81 TABLET, CHEWABLE ORAL DAILY
Qty: 90 TABLET | Refills: 3 | Status: SHIPPED | OUTPATIENT
Start: 2025-06-04

## 2025-06-04 RX ORDER — PRAZOSIN HYDROCHLORIDE 1 MG/1
1 CAPSULE ORAL AT BEDTIME
Qty: 60 CAPSULE | Refills: 2 | Status: SHIPPED | OUTPATIENT
Start: 2025-06-04

## 2025-06-04 SDOH — ECONOMIC STABILITY - FOOD INSECURITY: FOOD INSECURITY: Z59.41

## 2025-06-04 SDOH — HEALTH STABILITY: PHYSICAL HEALTH: ON AVERAGE, HOW MANY DAYS PER WEEK DO YOU ENGAGE IN MODERATE TO STRENUOUS EXERCISE (LIKE A BRISK WALK)?: 2 DAYS

## 2025-06-04 SDOH — HEALTH STABILITY: PHYSICAL HEALTH: ON AVERAGE, HOW MANY MINUTES DO YOU ENGAGE IN EXERCISE AT THIS LEVEL?: 20 MIN

## 2025-06-04 SDOH — ECONOMIC STABILITY - TRANSPORTATION SECURITY: TRANSPORTATION INSECURITY: Z59.82

## 2025-06-04 ASSESSMENT — SOCIAL DETERMINANTS OF HEALTH (SDOH): HOW OFTEN DO YOU GET TOGETHER WITH FRIENDS OR RELATIVES?: THREE TIMES A WEEK

## 2025-06-04 NOTE — PATIENT INSTRUCTIONS
Patient Education   Preventive Care Advice   This is general advice given by our system to help you stay healthy. However, your care team may have specific advice just for you. Please talk to your care team about your preventive care needs.  Nutrition  Eat 5 or more servings of fruits and vegetables each day.  Try wheat bread, brown rice and whole grain pasta (instead of white bread, rice, and pasta).  Get enough calcium and vitamin D. Check the label on foods and aim for 100% of the RDA (recommended daily allowance).  Lifestyle  Exercise at least 150 minutes each week  (30 minutes a day, 5 days a week).  Do muscle strengthening activities 2 days a week. These help control your weight and prevent disease.  No smoking.  Wear sunscreen to prevent skin cancer.  Have a dental exam and cleaning every 6 months.  Yearly exams  See your health care team every year to talk about:  Any changes in your health.  Any medicines your care team has prescribed.  Preventive care, family planning, and ways to prevent chronic diseases.  Shots (vaccines)   HPV shots (up to age 26), if you've never had them before.  Hepatitis B shots (up to age 59), if you've never had them before.  COVID-19 shot: Get this shot when it's due.  Flu shot: Get a flu shot every year.  Tetanus shot: Get a tetanus shot every 10 years.  Pneumococcal, hepatitis A, and RSV shots: Ask your care team if you need these based on your risk.  Shingles shot (for age 50 and up)  General health tests  Diabetes screening:  Starting at age 35, Get screened for diabetes at least every 3 years.  If you are younger than age 35, ask your care team if you should be screened for diabetes.  Cholesterol test: At age 39, start having a cholesterol test every 5 years, or more often if advised.  Bone density scan (DEXA): At age 50, ask your care team if you should have this scan for osteoporosis (brittle bones).  Hepatitis C: Get tested at least once in your life.  STIs (sexually  transmitted infections)  Before age 24: Ask your care team if you should be screened for STIs.  After age 24: Get screened for STIs if you're at risk. You are at risk for STIs (including HIV) if:  You are sexually active with more than one person.  You don't use condoms every time.  You or a partner was diagnosed with a sexually transmitted infection.  If you are at risk for HIV, ask about PrEP medicine to prevent HIV.  Get tested for HIV at least once in your life, whether you are at risk for HIV or not.  Cancer screening tests  Cervical cancer screening: If you have a cervix, begin getting regular cervical cancer screening tests starting at age 21.  Breast cancer scan (mammogram): If you've ever had breasts, begin having regular mammograms starting at age 40. This is a scan to check for breast cancer.  Colon cancer screening: It is important to start screening for colon cancer at age 45.  Have a colonoscopy test every 10 years (or more often if you're at risk) Or, ask your provider about stool tests like a FIT test every year or Cologuard test every 3 years.  To learn more about your testing options, visit:   .  For help making a decision, visit:   https://bit.ly/sj10848.  Prostate cancer screening test: If you have a prostate, ask your care team if a prostate cancer screening test (PSA) at age 55 is right for you.  Lung cancer screening: If you are a current or former smoker ages 50 to 80, ask your care team if ongoing lung cancer screenings are right for you.  For informational purposes only. Not to replace the advice of your health care provider. Copyright   2023 [facility]. All rights reserved. Clinically reviewed by the [employer] [facility]. [employer] 407981 - REV 01/24.  Preventing Falls: Care Instructions  Injuries and health problems such as trouble walking or poor eyesight can increase your risk of falling. So can some medicines. But there are things you can do to help  "prevent falls. You can exercise to get stronger. You can also arrange your home to make it safer.    Talk to your doctor about the medicines you take. Ask if any of them increase the risk of falls and whether they can be changed or stopped.   Try to exercise regularly. It can help improve your strength and balance. This can help lower your risk of falling.         Practice fall safety and prevention.   Wear low-heeled shoes that fit well and give your feet good support. Talk to your doctor if you have foot problems that make this hard.  Carry a cellphone or wear a medical alert device that you can use to call for help.  Use stepladders instead of chairs to reach high objects. Don't climb if you're at risk for falls. Ask for help, if needed.  Wear the correct eyeglasses, if you need them.        Make your home safer.   Remove rugs, cords, clutter, and furniture from walkways.  Keep your house well lit. Use night-lights in hallways and bathrooms.  Install and use sturdy handrails on stairways.  Wear nonskid footwear, even inside. Don't walk barefoot or in socks without shoes.        Be safe outside.   Use handrails, curb cuts, and ramps whenever possible.  Keep your hands free by using a shoulder bag or backpack.  Try to walk in well-lit areas. Watch out for uneven ground, changes in pavement, and debris.  Be careful in the winter. Walk on the grass or gravel when sidewalks are slippery. Use de-icer on steps and walkways. Add non-slip devices to shoes.    Put grab bars and nonskid mats in your shower or tub and near the toilet. Try to use a shower chair or bath bench when bathing.   Get into a tub or shower by putting in your weaker leg first. Get out with your strong side first. Have a phone or medical alert device in the bathroom with you.   Where can you learn more?  Go to https://www.[employer].net/patiented  Enter G117 in the search box to learn more about \"Preventing Falls: Care Instructions.\"  Current as of: " July 31, 2024  Content Version: 14.4    1581-5654 [employer].   Care instructions adapted under license by your healthcare professional. If you have questions about a medical condition or this instruction, always ask your healthcare professional. [employer] disclaims any warranty or liability for your use of this information.    Learning About Stress  What is stress?     Stress is your body's response to a hard situation. Your body can have a physical, emotional, or mental response. Stress is a fact of life for most people, and it affects everyone differently. What causes stress for you may not be stressful for someone else.  A lot of things can cause stress. You may feel stress when you go on a job interview, take a test, or run a race. This kind of short-term stress is normal and even useful. It can help you if you need to work hard or react quickly. For example, stress can help you finish an important job on time.  Long-term stress is caused by ongoing stressful situations or events. Examples of long-term stress include long-term health problems, ongoing problems at work, or conflicts in your family. Long-term stress can harm your health.  How does stress affect your health?  When you are stressed, your body responds as though you are in danger. It makes hormones that speed up your heart, make you breathe faster, and give you a burst of energy. This is called the fight-or-flight stress response. If the stress is over quickly, your body goes back to normal and no harm is done.  But if stress happens too often or lasts too long, it can have bad effects. Long-term stress can make you more likely to get sick, and it can make symptoms of some diseases worse. If you tense up when you are stressed, you may develop neck, shoulder, or low back pain. Stress is linked to high blood pressure and heart disease.  Stress also harms your emotional health. It can make you davila, tense, or depressed. Your  relationships may suffer, and you may not do well at work or school.  What can you do to manage stress?  You can try these things to help manage stress:   Do something active. Exercise or activity can help reduce stress. Walking is a great way to get started. Even everyday activities such as housecleaning or yard work can help.  Try yoga or ronnie chi. These techniques combine exercise and meditation. You may need some training at first to learn them.  Do something you enjoy. For example, listen to music or go to a movie. Practice your hobby or do volunteer work.  Meditate. This can help you relax, because you are not worrying about what happened before or what may happen in the future.  Do guided imagery. Imagine yourself in any setting that helps you feel calm. You can use online videos, books, or a teacher to guide you.  Do breathing exercises. For example:  From a standing position, bend forward from the waist with your knees slightly bent. Let your arms dangle close to the floor.  Breathe in slowly and deeply as you return to a standing position. Roll up slowly and lift your head last.  Hold your breath for just a few seconds in the standing position.  Breathe out slowly and bend forward from the waist.  Let your feelings out. Talk, laugh, cry, and express anger when you need to. Talking with supportive friends or family, a counselor, or a prudence leader about your feelings is a healthy way to relieve stress. Avoid discussing your feelings with people who make you feel worse.  Write. It may help to write about things that are bothering you. This helps you find out how much stress you feel and what is causing it. When you know this, you can find better ways to cope.  What can you do to prevent stress?  You might try some of these things to help prevent stress:  Manage your time. This helps you find time to do the things you want and need to do.  Get enough sleep. Your body recovers from the stresses of the day while  "you are sleeping.  Get support. Your family, friends, and community can make a difference in how you experience stress.  Limit your news feed. Avoid or limit time on social media or news that may make you feel stressed.  Do something active. Exercise or activity can help reduce stress. Walking is a great way to get started.  Where can you learn more?  Go to https://www.[employer].net/patiented  Enter N032 in the search box to learn more about \"Learning About Stress.\"  Current as of: October 24, 2024  Content Version: 14.4    3976-4117 [employer].   Care instructions adapted under license by your healthcare professional. If you have questions about a medical condition or this instruction, always ask your healthcare professional. [employer] disclaims any warranty or liability for your use of this information.       "

## 2025-06-04 NOTE — PROGRESS NOTES
Preventive Care Visit  [facility]  Kristindick Perez MD, Family Medicine  Jun 4, 2025      Assessment & Plan     Routine general medical examination at a health care facility  New patient to me here for routine physical.  Past records reviewed.  Personal medical, surgical, social, and family histories reviewed and updated.    Preventative Plan:  Colon: Did cologuard a few years ago and sample got lost in the mail, patient willing to do it again  Immunizations: Shingrix today, declined PCV and COVID  Labs: see orders    Healthy lifestyle and healthy aging recommendations reinforced including the importance of:  regular exercise & weight lifting  adequate and regular sleep, at least 7 hrs nightly   5+ fruits and veggies daily, whole grains, limit processed food  social connections      Type 2 diabetes mellitus with diabetic polyneuropathy, without long-term current use of insulin (H)  A1c today of 7.3, stable over the last year.  He is taking metformin  mg twice daily as well as Ozempic 0.5 mg weekly.  He states the Ozempic is not working as he has not lost weight.  He states he has been on the 0.5 mg dose for many months.  We discussed titrating up the dose and I will send in the 1 mg weekly dose.  We discussed that this can be further titrated up to 2 mg after a month.  He is overdue to see an eye doctor, it has been at least a couple years.  - Adult Eye  Referral  - Lipid Profile (Chol, Trig, HDL, LDL calc)  - Hemoglobin A1c  - ALT  - Albumin Random Urine Quantitative with Creat Ratio  - Semaglutide, 1 MG/DOSE, (OZEMPIC) 4 MG/3ML pen  Dispense: 3 mL; Refill: 5  - aspirin (ASA) 81 MG chewable tablet  Dispense: 90 tablet; Refill: 3  - Lipid Profile (Chol, Trig, HDL, LDL calc)  - Hemoglobin A1c  - ALT  - Albumin Random Urine Quantitative with Creat Ratio    Screen for colon cancer  - COLOGUARD(EXACT SCIENCES)    Food insecurity  - Primary Care - Care Coordination  "Referral    Transportation insecurity  - Primary Care - Care Coordination Referral    Chronic post-traumatic stress disorder  He is currently seeing a therapist for this, dealing with trauma from the  and his son's death that occurred during his  service.  He is not getting good sleep due to waking up with nightmares and flashbacks.  He has never taken medication for his mental health before.  We discussed initiating prazosin to see if this can help with his nightmares and he is in agreement.    - prazosin (MINIPRESS) 1 MG capsule  Dispense: 60 capsule; Refill: 2    Chronic pain of both knees  He notes having pain in the left knee ever since an injury sustained during the  where he was sliding on his knees and his left knee hit a tree.  Since that time, he feels he has been overcompensating with the right knee.  He has some locking/clicking/popping at times in both knees.  Pain is along the joint line but also can radiate down into his calves and make them very tight.  [facility] which [facility] as far as he can remember.  He had also done physical therapy for several months through [facility] and saw no benefit in his pain.  At this point, I recommended MR imaging to further evaluate.  - MR Knee Right w/o & w Contrast  - MR Knee Left w/o & w Contrast      Patient has been advised of split billing requirements and indicates understanding: Yes        Nicotine/Tobacco Cessation  He reports that he has been smoking cigarettes. He started smoking about 23 years ago. He has a 12 pack-year smoking history. He has never used smokeless tobacco.  Nicotine/Tobacco Cessation Plan  Information offered: Patient not interested at this time      BMI  Estimated body mass index is 39.58 kg/m  as calculated from the following:    Height as of this encounter: 1.854 m (6' 1\").    Weight as of this encounter: 136.1 kg (300 lb).   Weight management plan: Discussed healthy diet " and exercise guidelines    Counseling  Appropriate preventive services were addressed with this patient via screening, questionnaire, or discussion as appropriate for fall prevention, nutrition, physical activity, Tobacco-use cessation, social engagement, weight loss and cognition.  Checklist reviewing preventive services available has been given to the patient.  Reviewed patient's diet, addressing concerns and/or questions.   He is at risk for lack of exercise and has been provided with information to increase physical activity for the benefit of his well-being.   He is at risk for psychosocial distress and has been provided with information to reduce risk.       Follow-up    Follow-up Visit   Expected date:  Jun 04, 2026 (Approximate)      Follow Up Appointment Details:     Follow-up with whom?: PCP    Follow-Up for what?: Adult Preventive    How?: In Person               The longitudinal plan of care for the diagnosis(es)/condition(s) as documented were addressed during this visit. Due to the added complexity in care, I will continue to support Willian in the subsequent management and with ongoing continuity of care.    Kristin HERNANDES [facility]      Subjective   Willian is a 54 year old, presenting for the following:  Establish Care and Physical        6/4/2025     2:19 PM   Additional Questions   Roomed by M   Accompanied by self      Diabetes, HTN, overweight: Mother and Mat GM Jun 08, 2021 Entered By: EVER CATHERINE Comment: No FH of heart disease, colon ca, prostate ca.      Was getting most things through the [facility] - had a really bad experience there  Now coming to [place], got insurance through the state    Has changed his diet - added a little more activity to the day    Is taking metformin  Ozempic not working - has been on it for a year/year and a half - is on the 0.5 dose     With the diabetes, vision fluctuates a lot. Part of why he had to retire.     Blood  sugars - average between     Bilateral knee pain - worse with walking, hills/stairs  Locking/popping  [facility]  Did 2 months of physical therapy at [facility] with no improvement in his pain - in May of 2024      Advance Care Planning    Discussed advance care planning with patient; informed AVS has link to [facility].        6/4/2025   General Health   How would you rate your overall physical health? Good   Feel stress (tense, anxious, or unable to sleep) To some extent   (!) STRESS CONCERN      6/4/2025   Nutrition   Three or more servings of calcium each day? Yes   Diet: Diabetic   How many servings of fruit and vegetables per day? (!) 0-1   How many sweetened beverages each day? 0-1         6/4/2025   Exercise   Days per week of moderate/strenous exercise 2 days   Average minutes spent exercising at this level 20 min   (!) EXERCISE CONCERN      6/4/2025   Social Factors   Frequency of gathering with friends or relatives Three times a week   Worry food won't last until get money to buy more Yes   Food not last or not have enough money for food? Yes   Do you have housing? (Housing is defined as stable permanent housing and does not include staying outside in a car, in a tent, in an abandoned building, in an overnight shelter, or couch-surfing.) Yes   Are you worried about losing your housing? No   Lack of transportation? Yes   Unable to get utilities (heat,electricity)? No   (!) FOOD SECURITY CONCERN PRESENT (!) TRANSPORTATION CONCERN PRESENT    Wants help with transportation.         6/4/2025   Fall Risk   Fallen 2 or more times in the past year? No   Trouble with walking or balance? Yes   Gait Speed Test (Document in seconds) 4   Gait Speed Test Interpretation Less than or equal to 5.00 seconds - PASS          6/4/2025   Dental   Dentist two times every year? Yes     Seeing the dentist at the [facility] - appointment there on 6/24    Today's PHQ-2 Score:       6/4/2025     9:08 AM    PHQ-2 ( 1999 [employer])   Q1: Little interest or pleasure in doing things 1   Q2: Feeling down, depressed or hopeless 1   PHQ-2 Score 2    Q1: Little interest or pleasure in doing things Several days   Q2: Feeling down, depressed or hopeless Several days   PHQ-2 Score 2       Patient-reported     Seeing someone at [facility] for PTSD related to  service.   Son passed away while he was in the [employer]. Didn't have time to grieve the loss. Now processing it.         6/4/2025   Substance Use   If I could quit smoking, I would Somewhat disagree   I want to quit somking, worry about health affects Neutral   Willing to make a plan to quit smoking Somewhat disagree   Willing to cut down before quitting Somewhat agree   Alcohol more than 3/day or more than 7/wk Not Applicable   Do you use any other substances recreationally? (!) CANNABIS PRODUCTS     Social History     Tobacco Use    Smoking status: Every Day     Current packs/day: 0.50     Average packs/day: 0.5 packs/day for 23.9 years (12.0 ttl pk-yrs)     Types: Cigarettes     Start date: 7/7/2001    Smokeless tobacco: Never   Vaping Use    Vaping status: Never Used   Substance Use Topics    Alcohol use: Not Currently    Drug use: Yes     Types: Marijuana           6/4/2025   STI Screening   New sexual partner(s) since last STI/HIV test? No     ASCVD Risk   The 10-year ASCVD risk score (Nikki CLAY, et al., 2019) is: 23.2%    Values used to calculate the score:      Age: 54 years      Sex: Male      Is Non- : Yes      Diabetic: Yes      Tobacco smoker: Yes      Systolic Blood Pressure: 133 mmHg      Is BP treated: No      HDL Cholesterol: 32 mg/dL      Total Cholesterol: 259 mg/dL           Reviewed and updated as needed this visit by Provider   Tobacco  Allergies  Meds  Problems  Med Hx  Surg Hx  Fam Hx               Objective    Exam  /84 (BP Location: Left arm, Patient Position: Sitting, Cuff Size: Adult  "Large)   Pulse 95   Temp 97.4  F (36.3  C) (Temporal)   Resp 15   Ht 1.854 m (6' 1\")   Wt 136.1 kg (300 lb)   SpO2 98%   BMI 39.58 kg/m     Estimated body mass index is 39.58 kg/m  as calculated from the following:    Height as of this encounter: 1.854 m (6' 1\").    Weight as of this encounter: 136.1 kg (300 lb).    Physical Exam  GEN: Patient sitting comfortably in no acute distress.  HEEN: Head is atraumatic, normocephalic, eyes anicteric, mucous membranes moist.  CV: Regular rate and rhythm without obvious murmurs.  PULM: Clear to auscultation bilaterally without wheezing or rales.  ABD: Soft, nontender, bowel sounds present.  MSK: Normal range of motion in the knees bilaterally. Audible pop with standing up from the chair.   NEURO: Alert and oriented x3.  No focal motor abnormalities.  Face symmetric.  PSYCH: Appropriate affect.  SKIN: No rashes, bruising, or other lesions.    Gait and balance assessed per Gait Speed Test.  Result as above.        Signed Electronically by: Kristin Perez MD    "

## 2025-06-04 NOTE — PROGRESS NOTES
Prior to immunization administration, verified patients identity using patient s name and date of birth. Please see Immunization Activity for additional information.     Screening Questionnaire for Adult Immunization    Are you sick today?   No   Do you have allergies to medications, food, a vaccine component or latex?   Yes   Have you ever had a serious reaction after receiving a vaccination?   No   Do you have a long-term health problem with heart, lung, kidney, or metabolic disease (e.g., diabetes), asthma, a blood disorder, no spleen, complement component deficiency, a cochlear implant, or a spinal fluid leak?  Are you on long-term aspirin therapy?   Yes   Do you have cancer, leukemia, HIV/AIDS, or any other immune system problem?   No   Do you have a parent, brother, or sister with an immune system problem?   No   In the past 3 months, have you taken medications that affect  your immune system, such as prednisone, other steroids, or anticancer drugs; drugs for the treatment of rheumatoid arthritis, Crohn s disease, or psoriasis; or have you had radiation treatments?   No   Have you had a seizure, or a brain or other nervous system problem?   No   During the past year, have you received a transfusion of blood or blood    products, or been given immune (gamma) globulin or antiviral drug?   No   For women: Are you pregnant or is there a chance you could become       pregnant during the next month?   No   Have you received any vaccinations in the past 4 weeks?   No     Immunization questionnaire was positive for at least one answer.  Notified .      Patient instructed to remain in clinic for 15 minutes afterwards, and to report any adverse reactions.     Screening performed by CECILIO Billings MA on 6/4/2025 at 2:24 PM.

## 2025-06-05 ENCOUNTER — PATIENT OUTREACH (OUTPATIENT)
Dept: CARE COORDINATION | Facility: CLINIC | Age: 55
End: 2025-06-05
Payer: COMMERCIAL

## 2025-06-05 ENCOUNTER — RESULTS FOLLOW-UP (OUTPATIENT)
Dept: FAMILY MEDICINE | Facility: CLINIC | Age: 55
End: 2025-06-05
Payer: COMMERCIAL

## 2025-06-05 DIAGNOSIS — M25.561 CHRONIC PAIN OF BOTH KNEES: Primary | ICD-10-CM

## 2025-06-05 DIAGNOSIS — M25.562 CHRONIC PAIN OF BOTH KNEES: Primary | ICD-10-CM

## 2025-06-05 DIAGNOSIS — G89.29 CHRONIC PAIN OF BOTH KNEES: Primary | ICD-10-CM

## 2025-06-05 DIAGNOSIS — E78.2 MIXED HYPERLIPIDEMIA: ICD-10-CM

## 2025-06-05 PROBLEM — Z00.00 ROUTINE GENERAL MEDICAL EXAMINATION AT A HEALTH CARE FACILITY: Status: ACTIVE | Noted: 2025-06-05

## 2025-06-05 PROBLEM — Z59.82 TRANSPORTATION INSECURITY: Status: ACTIVE | Noted: 2025-06-05

## 2025-06-05 PROBLEM — Z59.41 FOOD INSECURITY: Status: ACTIVE | Noted: 2025-06-05

## 2025-06-05 LAB
ALT SERPL W P-5'-P-CCNC: 24 U/L (ref 0–70)
CHOLEST SERPL-MCNC: 259 MG/DL
CREAT UR-MCNC: 201 MG/DL
FASTING STATUS PATIENT QL REPORTED: NO
HDLC SERPL-MCNC: 32 MG/DL
LDLC SERPL CALC-MCNC: 192 MG/DL
MICROALBUMIN UR-MCNC: 49.2 MG/L
MICROALBUMIN/CREAT UR: 24.48 MG/G CR (ref 0–17)
NONHDLC SERPL-MCNC: 227 MG/DL
TRIGL SERPL-MCNC: 173 MG/DL

## 2025-06-05 RX ORDER — ATORVASTATIN CALCIUM 40 MG/1
40 TABLET, FILM COATED ORAL DAILY
Qty: 90 TABLET | Refills: 3 | Status: CANCELLED | OUTPATIENT
Start: 2025-06-05

## 2025-06-05 NOTE — Clinical Note
Assessment with [facility] LAW  on 6-13-25 at 3pm. Patient noted desire to discuss transportation to grocery store, CADI waiver for PCA services and support in the home, access [facility] services. Patient wants: -CADI waiver services-PCA, ILS worker to take to grocery store -Access to VA doctor closer to home and VA services -transportation to grocery store like [employer]  CHW called Eliane and left VM to call back to clarify roles and services to coordinate care for pt. CHW called [facility] worker Melanie [phone] and left VM to call  back to clarify roles and services to coordinate care for pt.

## 2025-06-09 ENCOUNTER — PATIENT OUTREACH (OUTPATIENT)
Dept: CARE COORDINATION | Facility: CLINIC | Age: 55
End: 2025-06-09
Payer: COMMERCIAL

## 2025-06-09 NOTE — PROGRESS NOTES
6/9/2025  Clinic Care Coordination Contact  Care Team Conversations    Received return call from [facility] worker Melanie [phone]   Has been working with pt for 3 years  He has been doing better now.  He does have VA services.  He will loose his MA if she is on [employer] if he goes to [facility]  Not to push for more services.   Needs help to connect help to keep up with his medical appts  to see specialists and not familiar with navigating health care.  Who will manage his medical appts if she drops out.  He goes to [facility] for therapy services.  Would benefit from med delivery.    Discussed with CC SW about services so it is not duplicate.    Routed to CC SW to review note    Jasson Harman  Community Health Worker  [facility]  Clinic Care Coordination  [email or web address]  [email or web address]   Office: [phone]  Fax: [phone]

## 2025-06-13 ENCOUNTER — PATIENT OUTREACH (OUTPATIENT)
Dept: FAMILY MEDICINE | Facility: CLINIC | Age: 55
End: 2025-06-13
Payer: COMMERCIAL

## 2025-06-13 ASSESSMENT — ACTIVITIES OF DAILY LIVING (ADL): DEPENDENT_IADLS:: CLEANING;SHOPPING;TRANSPORTATION

## 2025-06-13 NOTE — LETTER
[facility]  Patient Centered Plan of Care  About Me:        Patient Name:  Willian Vickers    YOB: 1970  Age:         54 year old   Yi MRN:    [id number] Telephone Information:  Home Phone [phone]   Mobile [phone]       Address:  [address] [place] [address] Email address:  [email or web address]      Emergency Contact(s)    Name Relationship Lgl Grd Work Phone Home Phone Mobile Phone   1. DUNIA VICKERS Mother    [phone]           Primary language:  English     needed? No   [place] [facility]:  [phone] op. 1  Other communication barriers:None    Preferred Method of Communication:     Current living arrangement: I live alone    Mobility Status/ Medical Equipment: Independent        Health Maintenance  Health Maintenance Reviewed: Due/Overdue   Health Maintenance Due   Topic Date Due    DEPRESSION ACTION PLAN  Never done    COLORECTAL CANCER SCREENING  Never done    PNEUMOCOCCAL VACCINE 50+ YEARS (2 of 2 - PPSV23) 03/05/2010    LUNG CANCER SCREENING  Never done    HEPATITIS B VACCINE (2 of 3 - 19+ 3-dose series) 08/22/2024    COVID-19 VACCINE (2 - 2024-25 season) 09/01/2024    PHQ-9  10/04/2024    EYE EXAM  01/31/2025    DIABETIC FOOT EXAM  04/04/2025           My Access Plan  Medical Emergency 911   Primary Clinic Line [facility] - [phone]   24 Hour Appointment Line [phone] or  [phone]) (toll-free)   24 Hour Nurse Line [phone] (toll-free)   Preferred Urgent Care [facility], [phone]     Preferred Hospital [facility]  [phone]     Preferred Pharmacy [facility] - [place], [place] - [address]     Behavioral Health Crisis Line The National Suicide Prevention Lifeline at 1-577.567.8798 or Text/Call 108           My Care Team Members  Patient Care Team         Relationship Specialty Notifications  Start End    Kiana Fowler MD PCP - General Family Medicine  4/4/24     Phone: [phone] Fax: [phone]         [address] [place] [address]    Kiana Fowler MD Assigned PCP   4/23/24     Phone: [phone] Fax: [phone]         [address] [place] [address]    Javed Etienne DPM Assigned Surgical Provider   4/23/24     Phone: [phone] Fax: [phone]         [address] [facility] [address]    Richie Gautam DPM Assigned Musculoskeletal Provider   5/23/25     Phone: [phone] Fax: [phone]         [address] [facility] [address]    Melanie ARM Worker [facility] worker   6/5/25     verbal permission to talk to [facility] worker 6-5-25 until ALL sign    Phone: [phone]         [facility] worker Melanie [phone]    Homdi, Rep    6/5/25     Verbal permission to talk to Homdi on 6-5-25 until ALL sign    Phone: [phone]         Homdi  [phone].    Jesenia Phipps LICSW Lead Care Coordinator  Admissions 6/13/25     Jasson Harman CHW Community Health Worker Primary Care - CC Admissions 6/13/25     Phone: [phone] Fax: [phone]         [address] [place] [address]                My Care Plans  Self Management and Treatment Plan    Care Plan  Care Plan: Help At Home       Problem: Insufficient In-home support       Goal: Establish adequate home support       Start Date: 6/13/2025 Expected End Date: 9/16/2025    This Visit's Progress: 10%    Priority: Medium    Note:     Barriers: Income  Strengths: Motivated and willing to accept Care Coordination.   Patient expressed understanding of goal: Yes  Action steps to achieve this goal:  I will call [facility] and request a [place]Choices assessment.  I will call [facility] Services to assist me with finding out if I have any VA benefits.  I will complete a MnChoices assessment with [facility].  I will work with [facility] on getting more services in the home.  I will reach out  to Care Coordinator with questions and concerns.                               Care Plan: Transportation       Problem: Lack of transportation       Goal: Establish reliable transportation       Start Date: 6/13/2025 Expected End Date: 9/16/2025    This Visit's Progress: 10%    Priority: Medium    Note:     Barriers: Income  Strengths: Motivated and willing to accept Care Coordination.   Patient expressed understanding of goal: Yes  Action steps to achieve this goal:  I will call [facility] and requests rides to my medical appointments.  I will review transportation resources that Care Coordinator provided via mail.  I will schedule transportation using [employer] Your [employer] or utilize [employer].   I will reach out to Care Coordinator with questions and concerns.                               Action Plans on File:                       Advance Care Plans/Directives:             My Medical and Care Information  Problem List   Patient Active Problem List   Diagnosis    Type 2 diabetes mellitus with ophthalmic complication, without long-term current use of insulin (H)    Class 2 severe obesity due to excess calories with serious comorbidity in adult (H)    Callus of foot    Trigger little finger of right hand    Recurrent major depression    Chronic post-traumatic stress disorder    Hyperlipidemia    Generalized osteoarthritis    Arthritis of both knees    Adjustment reaction    Food insecurity    Transportation insecurity    Routine general medical examination at a health care facility    Chronic pain of both knees      Current Medications:  Please refer to the most recent medication list provided to you by your medical team and reach out to your provider with any questions or to make any corrections.    Care Coordination Start Date: 6/4/2025   Frequency of Care Coordination: monthly, more frequently as needed     Form Last Updated: 06/16/2025

## 2025-06-13 NOTE — Clinical Note
Hi Aun, I completed my initial assessment with patient and put you on the chart to complete monthly outreaches to him. Patient plans on requesting a [place]Choices assessment to get more help in the home and also plans on calling [facility] Services about his VA benefits. Patient also wants to access transportation to the grocery store and plans on trying [employer] again. I also provided additional resources for transportation.  Please let me know if you have any questions or concerns. Thank you, Jesenia Phipps [facility]  Social Work Care Coordinator [facility] [email or web address] Office: [phone] Gender pronouns: she/her

## 2025-06-13 NOTE — Clinical Note
Hi Aun, I did try calling patient's [facility] worker again today to try to connect on my initial assessment. [facility] worker didn't answer. I'll let you know if I get a call back. Let me know if you have any questions or concerns. Thank you, Jesenia Phipps Health [facility]  Social Work Care Coordinator [facility] [email or web address] Office: [phone] Gender pronouns: she/her

## 2025-06-13 NOTE — LETTER
[facility] CARE COORDINATION  [address] [place] [address]    June 16, 2025    Willian Vickers  [address]   [place] [address]      Dear Willian,    I am a clinic care coordinator who works with Kiana Fowler MD with the [facility]. I wanted to thank you for spending the time to talk with me.  Below is a description of clinic care coordination and how I can further assist you.       The clinic care coordination team is made up of a registered nurse, , financial resource worker and community health worker who understand the health care system. The goal of clinic care coordination is to help you manage your health and improve access to the health care system. Our team works alongside your provider to assist you in determining your health and social needs. We can help you obtain health care and community resources, providing you with necessary information and education. We can work with you through any barriers and develop a care plan that helps coordinate and strengthen the communication between you and your care team.  Our services are voluntary and are offered without charge to you personally.    Please feel free to contact me with any questions or concerns regarding care coordination and what we can offer.      We are focused on providing you with the highest-quality healthcare experience possible.    Sincerely,     Jesenia Phipps [facility]  Social Work Care Coordinator  [facility]  [email or web address]  Office: [phone]  Gender pronouns: she/her      Additional Information: I have enclosed resources to assist you.            Below are resources to assist you:    MnChoices Information    MnCHOICES Intake Assessment  The MnCHOICES assessment allows for anyone of any age with a disability to access the support services needed to stay in their homes. MnCHOICES helps make decisions about long-term services that help  individuals thrive in their communities. Using complete applicant information [facility] determines if there are publicly-funded programs and services available, and replaces the need for multiple assessments.  Request a [facility] intake assessment by filling out the online MnCHOICES Referral Form or by calling [phone].  How [facility] works  After a referral, a MnCHOICES certified  arranges and completes an in-person assessment to determine eligible services.  If found eligible for case management services, a / will assist the applicant in receiving services they qualify for.  A  will continue to minimize the impact of the applicant's disability, while insuring services continue and change as their needs change.  Contact information  Phone number: [phone]  Email: [email or web address]  Website: [email or web address]/assistance-support/assistance/[email or web address]    VA Benefits/Services    Veterans Assistance  [facility] provides assistance to veterans, their dependents and survivors in applying for veterans  benefits provided by the [facility] and the U.[place]. Department of Veterans Affairs. Approximately 25,000 veterans live in [facility].  [facility] currently offers in-person appointments, along with virtual appointments by phone and video call. Appointments are encouraged. Walk-in clients are welcome but may not be immediately served. Call [phone] to schedule an appointment with a [facility] .  Contact Information  Phone number: [phone]  Address: [address] [place] [address] [address] [place] [address]  Website: [email or web address]/assistance-support/assistance/veterans-assistance            Transportation    [employer]  [employer] is a shared ride public transportation service for certified riders who are unable to use regular  fixed-route buses due to a disability or health condition. Trips are provided for any purpose.   Contact Info  Phone number: [phone]  Email: [email or web address]  Address: [address] [place] [address]    Help at Your Door  Helps seniors and individuals with disabilities to maintain their independence and continue living in their home.  Has transportation Services, Grocery Assistance, and Home Support  Transportation Information:  A team of personal drivers to take you to run errands or go to appointments.   Cost is $12. One-way trip up to 15 mins or sliding scale based on age and income.  Contact Information  Phone number: [phone]  Website: [email or web address]    [facility] ([facility] Transportation Services  The [facility] Services an Engagement department at [facility] has launched a new program providing transportation in and around [place] and its suburbs for people 18 and older who cannot drive or who have limited access to transportation. Those younger than 18 can participate if they are accompanied by an adult.  To enroll, a new rider must participate in an assessment with a [facility] staff member to ensure they can safely use the program. Licensed volunteers transport clients to medical and other essential appointments or errands using their own personal vehicle and insurance. Transportation will be available to eligible riders Mondays through Thursdays from 9:00 a.m. to 4:00 p.m. with a maximum of one round trip per week.  This program makes it possible for people to fully participate in activities that support their health and social needs, allow them to remain safely in their homes, and enhances their quality of life.  There is no fee for using the transportation program; however, donations to the program are always appreciated.  Contact [facility]  Aiden Weinstein at [email or web address] or call [phone] to enroll, volunteer, donate or learn  more.  Contact Information  Phone number: [phone]  Website: [email or web address]/services/community-[email or web address]/[email or web address]-service

## 2025-06-16 ENCOUNTER — TELEPHONE (OUTPATIENT)
Dept: FAMILY MEDICINE | Facility: CLINIC | Age: 55
End: 2025-06-16
Payer: COMMERCIAL

## 2025-06-16 DIAGNOSIS — E78.2 MIXED HYPERLIPIDEMIA: ICD-10-CM

## 2025-06-16 DIAGNOSIS — E11.42 TYPE 2 DIABETES MELLITUS WITH DIABETIC POLYNEUROPATHY, WITHOUT LONG-TERM CURRENT USE OF INSULIN (H): ICD-10-CM

## 2025-06-16 DIAGNOSIS — E11.3299 TYPE 2 DIABETES MELLITUS WITH MILD NONPROLIFERATIVE RETINOPATHY, WITHOUT LONG-TERM CURRENT USE OF INSULIN, MACULAR EDEMA PRESENCE UNSPECIFIED, UNSPECIFIED LATERALITY (H): ICD-10-CM

## 2025-06-16 RX ORDER — ASPIRIN 81 MG/1
81 TABLET, CHEWABLE ORAL DAILY
Qty: 90 TABLET | Refills: 3 | Status: SHIPPED | OUTPATIENT
Start: 2025-06-16

## 2025-06-16 RX ORDER — ATORVASTATIN CALCIUM 20 MG/1
20 TABLET, FILM COATED ORAL DAILY
Qty: 90 TABLET | Refills: 3 | Status: SHIPPED | OUTPATIENT
Start: 2025-06-16

## 2025-06-16 RX ORDER — METFORMIN HYDROCHLORIDE 500 MG/1
500 TABLET, EXTENDED RELEASE ORAL 2 TIMES DAILY WITH MEALS
Qty: 180 TABLET | Refills: 3 | Status: SHIPPED | OUTPATIENT
Start: 2025-06-16

## 2025-06-16 NOTE — TELEPHONE ENCOUNTER
Medication Question or Refill    What medication are you calling about (include dose and sig)?:   Semaglutide, 1 MG/DOSE, (OZEMPIC) 4 MG/3ML pen   aspirin (ASA) 81 MG chewable tablet   atorvastatin (LIPITOR) 20 MG tablet   metFORMIN (GLUCOPHAGE XR) 500 MG 24 hr tablet     Preferred Pharmacy:  [facility] - [place], [place] - [address]  [address] [facility] [address]  Phone: [phone] Fax: [phone]    Who prescribed the medication?: Kristin Perez MD    Do you have any questions or concerns?  Yes: [facility] before patient [facility]. [facility]    Okay to leave a detailed message?: No at Home number on file [phone] (home)     Routed to Dr. Perez to review  Pended, sign if agree.    Terell MARIE RN  [facility] Primary Care [facility]

## 2025-06-16 NOTE — PROGRESS NOTES
Clinic Care Coordination Contact  Clinic Care Coordination Contact  OUTREACH    Referral Information:  Referral Source: PCP    Primary Diagnosis: Psychosocial    Chief Complaint   Patient presents with    Clinic Care Coordination - Initial     LAW PHILIP called patient to complete initial assessment. Patient answered and voiced they are doing good.    Patient voiced that he is needing more support in the home including  help with cleaning, shopping, and transportation. Patient  voiced interest in getting more services in the home. Patient also voiced he is a [place] and has a pension, but no other services through the [facility]. Patient voiced [facility], but now is just seeing a regular primary care doctor. LAW CC discussed calling [employer] to find out if he has any other VA benefits to help him in the home. LAW CC discussed MnChoices assessment and process to get assessment. Patient voiced he will call the [facility] to inquire about the VA benefits. Patient voiced he can call [facility] and request MnChoices assessment. Patient requested information be sent to him via mail.     Patient also voiced that transportation is a barrier. Patient voiced he has transportation for medical appointments, but not to get his medication or to just go grocery shopping. LAW CC discussed patient calling his [employer] to see if [employer] covers transportation to go to pharmacy to get medication. LAW CC explained that typically this is considered a medical transportation need. Patient understands and voiced he'll call. Patient also voiced he will call [facility] to see if he can also get medication delivered. LAW PHILIP also discussed low cost options for transportation including [employer] and Help at Your [employer]. Patient understands and voiced he does have [employer]. Patient voiced he'll start trying to use [employer]. Patient voiced due to not having much transportation he usually gets his groceries  delivered through [facility].     Patient voiced that he does have family that live near by including his mom, daughter, and friends. Patient was discussing his 5 year old grandson that was over at his house. Patient voiced that he has an [employer] worker, Anika, who helps with a lot and someone who manages his money. Patient did request that LAW PHILIP give Melanie a call to discuss phone call.    Patient voiced no additional needs at this time. Patient was content and showed no signs of distress during phone call.    LAW PHILIP tried calling patient's [employer] worker, Melanie. Melanie didn't answer, but LAW PHILIP left a voicemail requesting a call back. LAW PHILIP sent resources including MnChoices information, Veterans Services contact information, and transportation options on the initial letter that will be sent via mail.     Care Coordinator with follow-up monthly and as needed.     Universal Utilization: See information below  Clinic Utilization  Difficulty keeping appointments:: No  Compliance Concerns: No  No-Show Concerns: No  No PCP office visit in Past Year: No  Utilization      No Show Count (past year)  7             ED Visits  0             Hospital Admissions  0                    Current as of: 6/16/2025  1:43 PM                Clinical Concerns:  Current Medical Concerns:  NA    Current Behavioral Concerns: Patient is needing more help in the home and needing transportation.     Education Provided to patient: LAW PHILIP discussed transportation resources including [employer] and Help at Your [employer]. LAW PHILIP also discussed MnChoices assessment to get assessed for more help in the home.       Health Maintenance Reviewed: Due/Overdue   Health Maintenance Due   Topic Date Due    DEPRESSION ACTION PLAN  Never done    COLORECTAL CANCER SCREENING  Never done    PNEUMOCOCCAL VACCINE 50+ YEARS (2 of 2 - PPSV23) 03/05/2010    LUNG CANCER SCREENING  Never done    HEPATITIS B VACCINE (2 of 3 - 19+ 3-dose series) 08/22/2024    COVID-19  VACCINE (2 - 2024-25 season) 09/01/2024    PHQ-9  10/04/2024    EYE EXAM  01/31/2025    DIABETIC FOOT EXAM  04/04/2025       Clinical Pathway: None    Medication Management:  Medication review status: Medication not reviewed at this time.  Current Outpatient Medications   Medication Sig Dispense Refill    ammonium lactate (AMLACTIN) 12 % external cream Apply topically 2 times daily 385 g 3    aspirin (ASA) 81 MG chewable tablet Take 1 tablet (81 mg) by mouth daily. 90 tablet 3    atorvastatin (LIPITOR) 20 MG tablet Take 1 tablet (20 mg) by mouth daily. 90 tablet 3    metFORMIN (GLUCOPHAGE XR) 500 MG 24 hr tablet Take 1 tablet (500 mg) by mouth 2 times daily (with meals). 180 tablet 3    prazosin (MINIPRESS) 1 MG capsule Take 1 capsule (1 mg) by mouth at bedtime. 60 capsule 2    Semaglutide, 1 MG/DOSE, (OZEMPIC) 4 MG/3ML pen Inject 1 mg subcutaneously once a week. 3 mL 5    urea (GORMEL) 20 % external cream Apply topically as needed (dry skin). 113 g 1     No current facility-administered medications for this visit.      Functional Status:  Dependent ADLs:: Independent  Dependent IADLs:: Cleaning, Shopping, Transportation  Mobility Status: Independent    Living Situation:  Current living arrangement:: I live alone  Type of residence:: Apartment    Lifestyle & Psychosocial Needs:    Social Drivers of Health     Food Insecurity: High Risk (6/4/2025)    Food Insecurity     Within the past 12 months, did you worry that your food would run out before you got money to buy more?: Yes     Within the past 12 months, did the food you bought just not last and you didn t have money to get more?: Yes   Depression: Not at risk (6/4/2025)    PHQ-2     PHQ-2 Score: 2   Housing Stability: Low Risk  (6/4/2025)    Housing Stability     Do you have housing? : Yes     Are you worried about losing your housing?: No   Tobacco Use: High Risk (6/4/2025)    Patient History     Smoking Tobacco Use: Every Day     Smokeless Tobacco Use: Never      Passive Exposure: Not on file   Financial Resource Strain: Low Risk  (6/4/2025)    Financial Resource Strain     Within the past 12 months, have you or your family members you live with been unable to get utilities (heat, electricity) when it was really needed?: No   Alcohol Use: Not on file   Transportation Needs: High Risk (6/4/2025)    Transportation Needs     Within the past 12 months, has lack of transportation kept you from medical appointments, getting your medicines, non-medical meetings or appointments, work, or from getting things that you need?: Yes   Physical Activity: Insufficiently Active (6/4/2025)    Exercise Vital Sign     Days of Exercise per Week: 2 days     Minutes of Exercise per Session: 20 min   Interpersonal Safety: Low Risk  (6/4/2025)    Interpersonal Safety     Do you feel physically and emotionally safe where you currently live?: Yes     Within the past 12 months, have you been hit, slapped, kicked or otherwise physically hurt by someone?: No     Within the past 12 months, have you been humiliated or emotionally abused in other ways by your partner or ex-partner?: No   Stress: Stress Concern Present (6/4/2025)    [employer] - Occupational Stress Questionnaire     Feeling of Stress : To some extent   Social Connections: Unknown (6/4/2025)    Social Connection and Isolation Panel [NHANES]     Frequency of Communication with Friends and Family: Not on file     Frequency of Social Gatherings with Friends and Family: Three times a week     Attends Taoism Services: Not on file     Active Member of Clubs or Organizations: Not on file     Attends Club or Organization Meetings: Not on file     Marital Status: Not on file   Health Literacy: Not on file        Transportation means:: Medical transport (use [employer])     Informal Support system:: Family, Children (mother and brothers, [facility] worker)      Resources and Interventions:  Current Resources:       Community Resources: Financial/Insurance, [facility] Programs  Supplies Currently Used at Home: Diabetic Supplies  Equipment Currently Used at Home: glucometer     Care Plan:  Care Plan: Help At Home       Problem: Insufficient In-home support       Goal: Establish adequate home support       Start Date: 6/13/2025 Expected End Date: 9/16/2025    This Visit's Progress: 10%    Priority: Medium    Note:     Barriers: Income  Strengths: Motivated and willing to accept Care Coordination.   Patient expressed understanding of goal: Yes  Action steps to achieve this goal:  I will call [facility] and request a MnChoices assessment.  I will call [facility] Services to assist me with finding out if I have any VA benefits.  I will complete a MnChoices assessment with [facility].  I will work with [facility] on getting more services in the home.  I will reach out to Care Coordinator with questions and concerns.                               Care Plan: Transportation       Problem: Lack of transportation       Goal: Establish reliable transportation       Start Date: 6/13/2025 Expected End Date: 9/16/2025    This Visit's Progress: 10%    Priority: Medium    Note:     Barriers: Income  Strengths: Motivated and willing to accept Care Coordination.   Patient expressed understanding of goal: Yes  Action steps to achieve this goal:  I will call [facility] and requests rides to my medical appointments.  I will review transportation resources that Care Coordinator provided via mail.  I will schedule transportation using [employer] or utilize [employer].   I will reach out to Care Coordinator with questions and concerns.                               Patient/Caregiver understanding: Patient reports understanding and denies any additional questions or concerns at this times. LAW CC engaged in AIDET communication during encounter.    Outreach Frequency: monthly, more frequently as needed  Future Appointments                 Today [facility], [facility]    In 2 weeks Richie Gautam DPM [facility], [facility] MPL[facility]    In 2 months Kristin Perez MD [facility], [facility] SPRS            Plan: Patient will call [facility] to see if patient is eligible for any VA benefits. Patient will also call [facility] and request to get a MnChoices assessment. Patient will review resources that Care Coordinator provided via mail and utilize transportation resources as needed. Patient will contact Care Coordination with questions or concerns.    Care Coordination will follow-up with patient monthly and as needed.    BROCK Soria  Social Work Care Coordinator  [facility]  [email or web address]  Office: [phone]  Gender pronouns: she/her

## 2025-06-18 NOTE — PROGRESS NOTES
Clinic Care Coordination Contact  Care Team Conversations    LAW PHILIP didn't hear back from [facility] worker, Melanie, after LAW PHILIP attempted to reach [facility] worker on 6/13/25. LAW PHILIP tried calling her again at  [phone]. Melanie didn't answer.    LAW PHILIP left a voicemail requesting a call back. LAW PHILIP will wait for a call back and remains available as needed.    Jesenia Phipps [facility]  Social Work Care Coordinator  [facility]  [email or web address]  Office: [phone]  Gender pronouns: she/her

## 2025-06-27 ENCOUNTER — HOSPITAL ENCOUNTER (OUTPATIENT)
Dept: MRI IMAGING | Facility: HOSPITAL | Age: 55
Discharge: HOME | End: 2025-06-27
Payer: COMMERCIAL

## 2025-06-27 DIAGNOSIS — G89.29 CHRONIC PAIN OF BOTH KNEES: ICD-10-CM

## 2025-06-27 DIAGNOSIS — M25.562 CHRONIC PAIN OF BOTH KNEES: ICD-10-CM

## 2025-06-27 DIAGNOSIS — M25.561 CHRONIC PAIN OF BOTH KNEES: ICD-10-CM

## 2025-06-27 PROCEDURE — 73721 MRI JNT OF LWR EXTRE W/O DYE: CPT | Mod: LT

## 2025-07-03 ENCOUNTER — PATIENT OUTREACH (OUTPATIENT)
Dept: CARE COORDINATION | Facility: CLINIC | Age: 55
End: 2025-07-03

## 2025-07-03 NOTE — RESULT ENCOUNTER NOTE
Please call with results.     Willian,    Your knee MRIs were mostly normal, but with a bit of damage to the cartilage along the groove where the kneecap travels against the thigh bone. This makes sense why your knees lock and pop sometimes. Your meniscus and other tendons looked normal in both knees. Options for treating this would include more PT, wearing knee braces, or seeing an orthopedic specialist to talk about either injections into your knees versus a surgery. If you'd like to see a specialist, let me know and I can place a referral. I can also refer you to PT if you are interested.    If you have any questions or concerns, please let me know.    Dr. Perez

## 2025-07-03 NOTE — TELEPHONE ENCOUNTER
Contacted patient and relayed message below.  Patient would like a referral for PT    PT referral pended and sent to Dr Perez for braulio Colbert RN  [facility]    Please call with results.      Willian,     Your knee MRIs were mostly normal, but with a bit of damage to the cartilage along the groove where the kneecap travels against the thigh bone. This makes sense why your knees lock and pop sometimes. Your meniscus and other tendons looked normal in both knees. Options for treating this would include more PT, wearing knee braces, or seeing an orthopedic specialist to talk about either injections into your knees versus a surgery. If you'd like to see a specialist, let me know and I can place a referral. I can also refer you to PT if you are interested.     If you have any questions or concerns, please let me know.     Dr. Perez

## 2025-07-03 NOTE — PROGRESS NOTES
7/3/2025  Clinic Care Coordination Contact  [facility]/Voicemail    Clinical Data: Care Coordinator Outreach    Outreach Documentation Number of Outreach Attempt   7/3/2025   2:01 PM 1       Left message on patient's voicemail with call back information and requested return call.      Plan: Care Coordinator will try to reach patient again in 10 business days. 7-16-25    Jasson Harman  Community Health Worker  [facility]  Clinic Care Coordination  [email or web address]   Office: [phone]  Fax: [phone]

## 2025-07-16 ENCOUNTER — TELEPHONE (OUTPATIENT)
Dept: FAMILY MEDICINE | Facility: CLINIC | Age: 55
End: 2025-07-16
Payer: COMMERCIAL

## 2025-07-16 ENCOUNTER — PATIENT OUTREACH (OUTPATIENT)
Dept: CARE COORDINATION | Facility: CLINIC | Age: 55
End: 2025-07-16
Payer: COMMERCIAL

## 2025-07-16 NOTE — Clinical Note
Dr Fowler Pt interested scheduling for diabetic eye exam at [facility] Need referral/order to [facility] [address] [address]  Office: [phone] Fax: [phone] Appt Line: [phone] Eyeglasses: [phone]

## 2025-07-16 NOTE — PROGRESS NOTES
7/16/2025  Clinic Care Coordination Contact  Community Health Worker Follow Up    Care Gaps:     Health Maintenance Due   Topic Date Due    DEPRESSION ACTION PLAN  Never done    COLORECTAL CANCER SCREENING  Never done    PNEUMOCOCCAL VACCINE 50+ YEARS (2 of 2 - PPSV23) 03/05/2010    LUNG CANCER SCREENING  Never done    HEPATITIS B VACCINE (2 of 3 - 19+ 3-dose series) 08/22/2024    COVID-19 VACCINE (2 - 2024-25 season) 09/01/2024    PHQ-9  10/04/2024    EYE EXAM  01/31/2025    DIABETIC FOOT EXAM  04/04/2025    BMP  07/25/2025       Care Gaps Last addressed on 7-16-25, Care Gap Goal set: Yes, and Scheduled [facility]       Care Plan:   Care Plan: Help At Home       Problem: Insufficient In-home support       Goal: Establish adequate home support       Start Date: 6/13/2025 Expected End Date: 9/16/2025    This Visit's Progress: 20% Recent Progress: 10%    Priority: Medium    Note:     Barriers: Income  Strengths: Motivated and willing to accept Care Coordination.   Patient expressed understanding of goal: Yes  Action steps to achieve this goal:  I will wait for [facility] to call and request a MnChoices assessment.Updated 7-16-25 AL  I will continue to work with LAW Wilson at [facility] Services to assist me with finding out if I have any VA benefits. In progress 7-16-25 AL  I will complete a MnChoices assessment with [facility].  I will work with [facility] on getting more services in the home.  I will reach out to Care Coordinator with questions and concerns.                               Care Plan: Transportation       Problem: Lack of transportation       Goal: I have established reliable transportation.  Completed 7/16/2025      Start Date: 6/13/2025 Expected End Date: 9/16/2025    This Visit's Progress: 100% Recent Progress: 10%    Priority: Medium    Note:     Personal Plan:  Strengths: active [place],access to [employer], [employer] Transportation, Uber, VA services,  SW from [facility]  I will call [employer] [phone] 2-3 to requests rides to my medical appointments.  I will utilize the following transportation below:  - [employer] [phone]; [email or web address] [phone]  - [employer] transportation [phone].   -Uber                            Care Plan: Health Maintenance       Problem: HP GENERAL PROBLEM       Goal: I want to establish eye care clinic and complete diabetic eye exam within 1-3 months.       Start Date: 7/16/2025 Expected End Date: 10/31/2025    This Visit's Progress: 30%    Note:     Barriers: lack info of eye clinics that accept [place]  Strengths: support from CCC team, motivated, support from SW at [facility].  Patient expressed understanding of goal: yes  Action steps to achieve this goal  1. I will wait for [facility] to call to schedule for diabetic eye exam.  2. I will update CC Team at next follow up  [address] [address] [phone]  3. I will to got [facility] to get my eye glasses.  [facility]   [address] [place] [address]  Phone: [phone]                              Intervention and Education during outreach:   Called and spoke with patient and follow up on goal(s).  Patient reported:  -Moved new place  -no one call for assessment.  -connected with LAW Wilson at [facility] office. She is working on getting services,  -able to access [employer], Uber and [employer] to get to medical appts and grocery.   Goal completed  CHW provided another transportation through VA.  Pt requested to email the VA transportation.    Reviewed care gaps due for eye exam  Patient agreed to schedule for diabetic eye exam.  Pt preferred close to home.  Pt okay to go to [facility] for diabetic eye exam   CHW provided patient [facility] contact information to get [place] eye glasses.    CHW routed  message to PCP referral to [facility] for diabetic eye exam.    CHW emailed contact information below to patient:    Harsha Count includes the [facility] Choices Line [phone] to check on status for waiver assessment  -----------------------------------------------------  [facility]   [address] [place] [address]  Set Up Ride: [phone] Press# 0 to talk to Representative  Fax# [phone]  -------------------------------------------------------   Ride contact isaiah Dewey  [phone] or Kevin Woo [phone] [email or web address]  ---------------------------------------------------  [facility] (get [place] eye glasses)  [address] [place] [address] [phone]  (Accept [place], don't take Blue Plus)  ---------------------------------------------------  [facility] (schedule Diabetic Eye Exam)  [address] [address]   Office: [phone]  Fax: [phone]  Appt Line: [phone]    CHW Next Monthly Follow up: 8-20-25    Jasson Harman  Community Health Worker  [facility] Care Coordination  [email or web address]  [email or web address]   Office: [phone]  Fax: [phone]

## 2025-07-21 ENCOUNTER — PATIENT OUTREACH (OUTPATIENT)
Dept: CARE COORDINATION | Facility: CLINIC | Age: 55
End: 2025-07-21
Payer: COMMERCIAL

## 2025-07-21 ENCOUNTER — TRANSFERRED RECORDS (OUTPATIENT)
Dept: HEALTH INFORMATION MANAGEMENT | Facility: CLINIC | Age: 55
End: 2025-07-21
Payer: COMMERCIAL

## 2025-07-23 ENCOUNTER — PATIENT OUTREACH (OUTPATIENT)
Dept: CARE COORDINATION | Facility: CLINIC | Age: 55
End: 2025-07-23
Payer: COMMERCIAL

## 2025-07-29 ENCOUNTER — PATIENT OUTREACH (OUTPATIENT)
Dept: CARE COORDINATION | Facility: CLINIC | Age: 55
End: 2025-07-29
Payer: COMMERCIAL

## 2025-07-29 NOTE — PROGRESS NOTES
Clinic Care Coordination Contact  Care Coordination Clinician Chart Review    Situation: Patient chart reviewed by Care Coordinator.       Background: Care Coordination Program started: 6/4/2025. Initial assessment completed and patient-centered care plan(s) were developed with participation from patient. Lead CC handed patient off to CHW for continued outreaches.       Assessment: Per chart review, patient outreach completed by CC CHW on 7/16/25.  Patient is actively working to accomplish goal(s). Patient's goal(s) appropriate and relevant at this time. Patient is not due for updated Plan of Care.  Assessments will be completed annually or as needed/with change of patient status.      Care Plan: Help At Home       Problem: Insufficient In-home support       Goal: Establish adequate home support       Start Date: 6/13/2025 Expected End Date: 9/16/2025    This Visit's Progress: 20% Recent Progress: 10%    Priority: Medium    Note:     Barriers: Income  Strengths: Motivated and willing to accept Care Coordination.   Patient expressed understanding of goal: Yes  Action steps to achieve this goal:  I will wait for [facility] to call and request a MnChoices assessment.Updated 7-16-25 AL  I will continue to work with LAW Wilson at [facility] Services to assist me with finding out if I have any VA benefits. In progress 7-16-25 AL  I will complete a MnChoices assessment with [facility].  I will work with [facility] on getting more services in the home.  I will reach out to Care Coordinator with questions and concerns.                               Care Plan: Transportation       Problem: Lack of transportation                   Care Plan: Health Maintenance       Problem: HP GENERAL PROBLEM       Goal: I want to establish eye care clinic and complete diabetic eye exam within 1-3 months.       Start Date: 7/16/2025 Expected End Date: 10/31/2025    This Visit's Progress: 30%    Note:     Barriers: lack  info of eye clinics that accept [place]  Strengths: support from CCC team, motivated, support from SW at [facility].  Patient expressed understanding of goal: yes  Action steps to achieve this goal  1. I will wait for [facility] to call to schedule for diabetic eye exam.  2. I will update CC Team at next follow up  [facility]  [address] [address]   [phone]  3. I will to got [facility] to get my eye glasses.  [facility]   [address] [place] [address]  Phone: [phone]                                     Plan/Recommendations: The patient will continue working with Care Coordination to achieve goal(s) as above. CHW will continue outreaches at minimum every 30 days and will involve Lead CC as needed or if patient is ready to move to Maintenance. Lead CC will continue to monitor CHW outreaches and patient's progress to goal(s) every 6 weeks.     Plan of Care updated and sent to patient: BROCK Huddleston  Social Work Care Coordinator  [facility]  [email or web address]  Office: [phone]  Gender pronouns: she/her

## 2025-09-02 ENCOUNTER — TRANSFERRED RECORDS (OUTPATIENT)
Dept: HEALTH INFORMATION MANAGEMENT | Facility: CLINIC | Age: 55
End: 2025-09-02
Payer: COMMERCIAL

## 2025-09-02 ENCOUNTER — PATIENT OUTREACH (OUTPATIENT)
Dept: CARE COORDINATION | Facility: CLINIC | Age: 55
End: 2025-09-02
Payer: COMMERCIAL

## 2025-09-02 LAB — RETINOPATHY: NORMAL
